# Patient Record
Sex: FEMALE | Race: WHITE | HISPANIC OR LATINO | Employment: FULL TIME | URBAN - METROPOLITAN AREA
[De-identification: names, ages, dates, MRNs, and addresses within clinical notes are randomized per-mention and may not be internally consistent; named-entity substitution may affect disease eponyms.]

---

## 2018-10-09 ENCOUNTER — OFFICE VISIT (OUTPATIENT)
Dept: URGENT CARE | Facility: CLINIC | Age: 23
End: 2018-10-09
Payer: COMMERCIAL

## 2018-10-09 VITALS
HEIGHT: 61 IN | OXYGEN SATURATION: 95 % | DIASTOLIC BLOOD PRESSURE: 70 MMHG | WEIGHT: 172.8 LBS | TEMPERATURE: 98.5 F | BODY MASS INDEX: 32.62 KG/M2 | SYSTOLIC BLOOD PRESSURE: 110 MMHG | HEART RATE: 108 BPM | RESPIRATION RATE: 18 BRPM

## 2018-10-09 DIAGNOSIS — J45.909 REACTIVE AIRWAY DISEASE WITHOUT COMPLICATION, UNSPECIFIED ASTHMA SEVERITY, UNSPECIFIED WHETHER PERSISTENT: ICD-10-CM

## 2018-10-09 PROCEDURE — 99203 OFFICE O/P NEW LOW 30 MIN: CPT | Performed by: PHYSICIAN ASSISTANT

## 2018-10-09 RX ORDER — ALBUTEROL SULFATE 90 UG/1
2 AEROSOL, METERED RESPIRATORY (INHALATION) EVERY 6 HOURS PRN
Qty: 8.5 G | Refills: 0 | Status: SHIPPED | OUTPATIENT
Start: 2018-10-09 | End: 2020-06-22 | Stop reason: SDUPTHER

## 2018-10-10 ASSESSMENT — ENCOUNTER SYMPTOMS
DIARRHEA: 0
CHEST TIGHTNESS: 1
VOMITING: 0
EYE DISCHARGE: 0
WHEEZING: 1
SORE THROAT: 0
HEADACHES: 0
COUGH: 1
FEVER: 0
CHILLS: 0
SPUTUM PRODUCTION: 0
ABDOMINAL PAIN: 0
DIZZINESS: 0
SHORTNESS OF BREATH: 0
EYE REDNESS: 0

## 2018-10-10 NOTE — PROGRESS NOTES
"Subjective:      Yuri Ewing is a 23 y.o. female who presents with Asthma (asthma attack x3 days )            Patient is a pleasant 23-year-old female who presents today today requesting albuterol inhaler.  Patient reports having asthma for the last 5 years however patient has been needing to utilize her mother's rescue inhaler as she is not been able to see a provider or pulmonology in the last several years.  Patient is also requesting referral to pulmonology for further evaluation and appropriate inhalers.  Patient reports last few days with recent weather changes she has been needing to use her mother's albuterol twice a day in the least with notable improvement of symptoms.  She denies any fevers, chills.      Asthma   She complains of chest tightness, cough and wheezing. There is no shortness of breath or sputum production. This is a new problem. Episode onset: 3-4 days ago. The problem occurs intermittently. The problem has been waxing and waning. The cough is non-productive. Associated symptoms include malaise/fatigue. Pertinent negatives include no fever, headaches or sore throat. Her symptoms are aggravated by any activity and change in weather. Relieved by: Her mother's rescue inhaler. Her past medical history is significant for asthma.       Review of Systems   Constitutional: Positive for malaise/fatigue. Negative for chills and fever.   HENT: Negative for sore throat.    Eyes: Negative for discharge and redness.   Respiratory: Positive for cough and wheezing. Negative for sputum production and shortness of breath.    Gastrointestinal: Negative for abdominal pain, diarrhea and vomiting.   Neurological: Negative for dizziness and headaches.   All other systems reviewed and are negative.         Objective:     /70 (BP Location: Left arm, Patient Position: Sitting, BP Cuff Size: Adult)   Pulse (!) 108   Temp 36.9 °C (98.5 °F)   Resp 18   Ht 1.549 m (5' 1\")   Wt 78.4 kg (172 lb 12.8 oz) "   SpO2 95%   BMI 32.65 kg/m²    PMH:  has no past medical history on file.  MEDS:   Current Outpatient Prescriptions:   •  albuterol 108 (90 Base) MCG/ACT Aero Soln inhalation aerosol, Inhale 2 Puffs by mouth every 6 hours as needed for Shortness of Breath., Disp: 8.5 g, Rfl: 0  ALLERGIES: No Known Allergies  SURGHX: No past surgical history on file.  SOCHX:    FH: Family history was reviewed, no pertinent findings to report    Physical Exam   Constitutional: She is oriented to person, place, and time. She appears well-developed and well-nourished. No distress.   HENT:   Head: Normocephalic and atraumatic.   Right Ear: External ear normal.   Left Ear: External ear normal.   Mouth/Throat: Oropharynx is clear and moist. No oropharyngeal exudate.   Eyes: Pupils are equal, round, and reactive to light. Conjunctivae and EOM are normal.   Neck: Normal range of motion. Neck supple. No tracheal deviation present.   Cardiovascular: Normal rate and regular rhythm.    No murmur heard.  Pulmonary/Chest: Effort normal and breath sounds normal. No respiratory distress.   Musculoskeletal: Normal range of motion. She exhibits no edema.   Neurological: She is alert and oriented to person, place, and time. Coordination normal.   Skin: Skin is warm. No rash noted.   Psychiatric: She has a normal mood and affect. Her behavior is normal. Judgment and thought content normal.   Vitals reviewed.              Assessment/Plan:     1. Reactive airway disease without complication, unspecified asthma severity, unspecified whether persistent  - albuterol 108 (90 Base) MCG/ACT Aero Soln inhalation aerosol; Inhale 2 Puffs by mouth every 6 hours as needed for Shortness of Breath.  Dispense: 8.5 g; Refill: 0  - REFERRAL TO PULMONOLOGY    Patient without any notable wheezing on exam however nebulizer treatment was offered for this patient she however declined.  Refill on albuterol was given today along with referred to pulmonary at the request of  the patient today.  Patient is to avoid triggers as discussed, start on allergy medication and follow-up with pulmonology.  Patient given precautionary s/sx that mandate immediate follow up and evaluation in the ED. Advised of risks of not doing so.    DDX, Supportive care, and indications for immediate follow-up discussed with patient.    Instructed to return to clinic or nearest emergency department if we are not available for any change in condition, further concerns, or worsening of symptoms.    The patient demonstrated a good understanding and agreed with the treatment plan.  Please note that this dictation was created using voice recognition software. I have made every reasonable attempt to correct obvious errors, but I expect that there are errors of grammar and possibly content that I did not discover before finalizing the note.

## 2018-11-02 ENCOUNTER — TELEPHONE (OUTPATIENT)
Dept: RADIOLOGY | Facility: IMAGING CENTER | Age: 23
End: 2018-11-02

## 2018-11-02 DIAGNOSIS — R93.89 ABNORMAL CHEST X-RAY: ICD-10-CM

## 2018-11-05 ENCOUNTER — TELEPHONE (OUTPATIENT)
Dept: PULMONOLOGY | Facility: HOSPICE | Age: 23
End: 2018-11-05

## 2018-11-05 DIAGNOSIS — R06.02 SOB (SHORTNESS OF BREATH): ICD-10-CM

## 2018-11-07 ENCOUNTER — NON-PROVIDER VISIT (OUTPATIENT)
Dept: PULMONOLOGY | Facility: HOSPICE | Age: 23
End: 2018-11-07
Payer: COMMERCIAL

## 2018-11-07 ENCOUNTER — OFFICE VISIT (OUTPATIENT)
Dept: PULMONOLOGY | Facility: HOSPICE | Age: 23
End: 2018-11-07
Payer: COMMERCIAL

## 2018-11-07 ENCOUNTER — APPOINTMENT (OUTPATIENT)
Dept: RADIOLOGY | Facility: IMAGING CENTER | Age: 23
End: 2018-11-07
Payer: COMMERCIAL

## 2018-11-07 VITALS
BODY MASS INDEX: 31.53 KG/M2 | HEART RATE: 76 BPM | OXYGEN SATURATION: 98 % | TEMPERATURE: 98.2 F | DIASTOLIC BLOOD PRESSURE: 70 MMHG | RESPIRATION RATE: 14 BRPM | WEIGHT: 167 LBS | HEIGHT: 61 IN | SYSTOLIC BLOOD PRESSURE: 110 MMHG

## 2018-11-07 VITALS — HEIGHT: 61 IN | WEIGHT: 167 LBS | BODY MASS INDEX: 31.53 KG/M2

## 2018-11-07 DIAGNOSIS — R06.02 SOB (SHORTNESS OF BREATH): ICD-10-CM

## 2018-11-07 DIAGNOSIS — R93.89 ABNORMAL CHEST X-RAY: ICD-10-CM

## 2018-11-07 DIAGNOSIS — J45.20 MILD INTERMITTENT ASTHMA WITHOUT COMPLICATION: ICD-10-CM

## 2018-11-07 PROCEDURE — 94726 PLETHYSMOGRAPHY LUNG VOLUMES: CPT | Performed by: INTERNAL MEDICINE

## 2018-11-07 PROCEDURE — 94060 EVALUATION OF WHEEZING: CPT | Performed by: INTERNAL MEDICINE

## 2018-11-07 PROCEDURE — 99203 OFFICE O/P NEW LOW 30 MIN: CPT | Mod: 25 | Performed by: INTERNAL MEDICINE

## 2018-11-07 PROCEDURE — 94729 DIFFUSING CAPACITY: CPT | Performed by: INTERNAL MEDICINE

## 2018-11-07 RX ORDER — ALBUTEROL SULFATE 90 UG/1
AEROSOL, METERED RESPIRATORY (INHALATION)
COMMUNITY
Start: 2011-02-22

## 2018-11-07 RX ORDER — BUDESONIDE AND FORMOTEROL FUMARATE DIHYDRATE 80; 4.5 UG/1; UG/1
2 AEROSOL RESPIRATORY (INHALATION) 2 TIMES DAILY
Qty: 1 INHALER | Refills: 0 | Status: SHIPPED | OUTPATIENT
Start: 2018-11-07 | End: 2022-03-01

## 2018-11-07 RX ORDER — ETONOGESTREL/ETHINYL ESTRADIOL .12-.015MG
RING, VAGINAL VAGINAL
Refills: 0 | Status: ON HOLD | COMMUNITY
Start: 2018-09-04 | End: 2020-04-12

## 2018-11-07 RX ORDER — ALBUTEROL SULFATE 90 UG/1
AEROSOL, METERED RESPIRATORY (INHALATION)
COMMUNITY
Start: 2011-02-22 | End: 2022-03-01 | Stop reason: SDUPTHER

## 2018-11-07 RX ORDER — CODEINE PHOSPHATE AND GUAIFENESIN 10; 100 MG/5ML; MG/5ML
5 SOLUTION ORAL
Status: ON HOLD | COMMUNITY
Start: 2014-08-17 | End: 2020-04-12

## 2018-11-07 RX ORDER — IBUPROFEN 200 MG
200 TABLET ORAL
Status: ON HOLD | COMMUNITY
End: 2020-04-12 | Stop reason: SDUPTHER

## 2018-11-07 RX ORDER — FLUTICASONE PROPIONATE 110 UG/1
1-2 AEROSOL, METERED RESPIRATORY (INHALATION)
COMMUNITY
Start: 2014-08-17 | End: 2022-03-01

## 2018-11-07 RX ORDER — IMIPRAMINE HYDROCHLORIDE 25 MG/1
TABLET ORAL
COMMUNITY
Start: 2011-02-22

## 2018-11-07 ASSESSMENT — PULMONARY FUNCTION TESTS
FEV1_PERCENT_CHANGE: 7
FVC_PREDICTED: 3.43
FEV1/FVC_PERCENT_LLN: 71
FVC_PERCENT_PREDICTED: 93
FEV1/FVC: 71
FEV1/FVC: 76
FEV1/FVC_PERCENT_PREDICTED: 83
FVC_LLN: 2.86
FEV1/FVC_PERCENT_PREDICTED: 83
FEV1/FVC: 76.01
FVC: 3.46
FEV1_LLN: 2.49
FEV1/FVC_PERCENT_LLN: 71
FEV1/FVC_PREDICTED: 85
FEV1: 2.63
FEV1_PERCENT_CHANGE: 14
FEV1/FVC_PERCENT_CHANGE: 6
FEV1_LLN: 2.49
FEV1/FVC_PERCENT_CHANGE: 200
FEV1/FVC_PERCENT_PREDICTED: 87
FVC: 3.22
FEV1_PREDICTED: 2.98
FVC_LLN: 2.86
FEV1: 2.3
FEV1/FVC: 71
FEV1_PERCENT_PREDICTED: 77

## 2018-11-07 ASSESSMENT — PAIN SCALES - GENERAL: PAINLEVEL: NO PAIN

## 2018-11-07 NOTE — PROGRESS NOTES
Yuri Ewing is a 23 y.o. female here for ***. Patient was referred by ***.    History of Present Illness:  ***    Constitutional ROS: No unexpected change in weight, No unexplained fevers  Eyes: No change in vision or blurring or double vision  Mouth/Throat ROS: No sore throat, No recent change in voice or hoarseness  Pulmonary ROS: See present history for pertinent positives  Cardiovascular ROS: No chest pain to suggest acute coronary syndrome  Gastrointestinal ROS: No abdominal pain to suggest peptic disease  Musculoskeletal/Extremities ROS: no acute artritis or unusual swelling  Hematologic/Lymphatic ROS: No easy bleeding or unusual lymph node swelling  Neurologic ROS: No new or unusual weakness  Psychiatric ROS: No hallucinations  Allergic/Immunologic: No  urticaria or allergic rash      Current Outpatient Prescriptions   Medication Sig Dispense Refill   • NUVARING 0.12-0.015 MG/24HR vaginal ring insert 1 ring vaginally for 3 weeks REMOVE for 1 week and repeat AGAIN  0   • Spacer/Aero-Holding Chambers (AEROCHAMBER PLUS CINDY-VU) Misc Use as directed with metered dose inhaler.     • Spacer/Aero-Holding Chambers (AEROCHAMBER PLUS CINDY-VU) Misc by Other route.     • budesonide-formoterol (SYMBICORT) 80-4.5 MCG/ACT Aerosol Inhale 2 Puffs by mouth 2 Times a Day. Use spacer. Rinse mouth after each use. 1 Inhaler 0   • albuterol 108 (90 Base) MCG/ACT Aero Soln inhalation aerosol Inhale 2 Puffs by mouth every 6 hours as needed for Shortness of Breath. 8.5 g 0   • albuterol (PROAIR HFA) 108 (90 Base) MCG/ACT Aero Soln inhalation aerosol Shake well.  Take 2 puffs every 4 hours as needed for cough, chest tightness, or wheezing.     • acetaminophen-codeine #3 (TYLENOL #3) 300-30 MG Tab Take 1-2 tablet by mouth.     • ALBUTEROL INH 2 Puffs by Nebulization route.     • guaifenesin-codeine (ROBITUSSIN AC) Solution oral solution Take 5 mL by mouth.     • fluticasone (FLOVENT HFA) 110 MCG/ACT Aerosol 1-2 Puffs by  "Nebulization route.     • ibuprofen (MOTRIN) 200 MG Tab Take 200 mg by mouth.     • albuterol (PROAIR HFA) 108 (90 Base) MCG/ACT Aero Soln inhalation aerosol by Other route.       No current facility-administered medications for this visit.        Social History   Substance Use Topics   • Smoking status: Never Smoker   • Smokeless tobacco: Never Used   • Alcohol use Yes      Comment: Rarely        Past Medical History:   Diagnosis Date   • Back pain    • Bronchitis    • Chest tightness    • Chickenpox    • Cough    • Difficulty breathing    • Gasping for breath    • Painful breathing    • Shortness of breath    • Sputum production    • Wears glasses    • Wheezing        History reviewed. No pertinent surgical history.    Allergies: Patient has no known allergies.    Family History   Problem Relation Age of Onset   • Alzheimer's Disease Mother    • Alzheimer's Disease Sister        Physical Examination    Vitals:    11/07/18 1009   Height: 1.549 m (5' 1\")   Weight: 75.8 kg (167 lb)   Weight % change since last entry.: 0 %   BP: 110/70   Pulse: 76   BMI (Calculated): 31.55   Resp: 14   Temp: 36.8 °C (98.2 °F)   TempSrc: Oral       General Appearance: alert, no distress  Skin: Skin color, texture, turgor normal. No rashes or lesions.  Eyes: negative  Oropharynx: Lips, mucosa, and tongue normal. Teeth and gums normal. Oropharynx moist and without lesion  Lungs: positive findings: ***  Heart: negative. RRR without murmur, gallop, or rubs.  No ectopy.  Abdomen: Abdomen soft, non-tender. . No masses,  No organomegaly  Extremities:  No deformities, edema, or skin discoloration  Joints: No acute arthritis  Peripheral Pulses:perfused  Neurologic: intact grossly  ***    {MALLAMPATI:75512}    Imaging: ***    PFTS: ***      Assessment and Plan  1. Mild intermittent asthma without complication  ***  - Influenza Vaccine Quad Injection >3Y (PF)      ***  Followup Return in about 3 months (around 2/7/2019) for follow up visit with " Dr. Swetha Diaz.

## 2018-11-07 NOTE — PATIENT INSTRUCTIONS
This lady comes in for evaluation of reactive airway disease, has been using rescue inhaler with good success.  However she has not been on a maintenance inhaler and looking at lung function testing today, she has a dramatic bronchodilator response.  We added Symbicort 80, 2 puffs in the morning and 2 puffs at night as maintenance, she will rinse and gargle after using it.  If she has bronchospasm she can use her pro-air, or nebulizer up to every 4 hours.    We are holding off on the x-ray as she thinks she could be pregnant, we will do that in the future if she has worsening symptoms or if we are certain that she is not carrying a child.    We also recommended a flu vaccine, at present she declines that but plans to read about it and I did place the order, it is deferred at present but she can have that done in the future.    She does have allergies to cats, potentially molds, we did talk about doing a blood allergy panel but she declines that at this time.  If symptoms become more prominent that would be reconsidered.  We will initiate the maintenance inhaler, see her back in about 3 months, sooner for problems

## 2018-11-07 NOTE — PROGRESS NOTES
Yuri Ewing is a 23 y.o. female here for asthma and results of lung function testing. Patient was referred by her primary care doctor.    History of Present Illness:      This lady comes in for evaluation of reactive airway disease, has been using rescue inhaler with good success.  However she has not been on a maintenance inhaler and looking at lung function testing today, she has a dramatic bronchodilator response.  We added Symbicort 80, 2 puffs in the morning and 2 puffs at night as maintenance, she will rinse and gargle after using it.  If she has bronchospasm she can use her pro-air, or nebulizer up to every 4 hours.    We are holding off on the x-ray as she thinks she could be pregnant, we will do that in the future if she has worsening symptoms or if we are certain that she is not carrying a child.    We also recommended a flu vaccine, at present she declines that but plans to read about it and I did place the order, it is deferred at present but she can have that done in the future.    She does have allergies to cats, potentially molds, we did talk about doing a blood allergy panel but she declines that at this time.  If symptoms become more prominent that would be reconsidered.  We will initiate the maintenance inhaler, see her back in about 3 months, sooner for problems    Constitutional ROS: No unexpected change in weight, No unexplained fevers  Eyes: No change in vision or blurring or double vision  Mouth/Throat ROS: No sore throat, No recent change in voice or hoarseness  Pulmonary ROS: See present history for pertinent positives  Cardiovascular ROS: No chest pain to suggest acute coronary syndrome  Gastrointestinal ROS: No abdominal pain to suggest peptic disease  Musculoskeletal/Extremities ROS: no acute artritis or unusual swelling  Hematologic/Lymphatic ROS: No easy bleeding or unusual lymph node swelling  Neurologic ROS: No new or unusual weakness  Psychiatric ROS: No  hallucinations  Allergic/Immunologic: No  urticaria or allergic rash      Current Outpatient Prescriptions   Medication Sig Dispense Refill   • NUVARING 0.12-0.015 MG/24HR vaginal ring insert 1 ring vaginally for 3 weeks REMOVE for 1 week and repeat AGAIN  0   • Spacer/Aero-Holding Chambers (AEROCHAMBER PLUS CINDY-VU) Misc Use as directed with metered dose inhaler.     • Spacer/Aero-Holding Chambers (AEROCHAMBER PLUS CINDY-VU) Misc by Other route.     • budesonide-formoterol (SYMBICORT) 80-4.5 MCG/ACT Aerosol Inhale 2 Puffs by mouth 2 Times a Day. Use spacer. Rinse mouth after each use. 1 Inhaler 0   • albuterol 108 (90 Base) MCG/ACT Aero Soln inhalation aerosol Inhale 2 Puffs by mouth every 6 hours as needed for Shortness of Breath. 8.5 g 0   • albuterol (PROAIR HFA) 108 (90 Base) MCG/ACT Aero Soln inhalation aerosol Shake well.  Take 2 puffs every 4 hours as needed for cough, chest tightness, or wheezing.     • acetaminophen-codeine #3 (TYLENOL #3) 300-30 MG Tab Take 1-2 tablet by mouth.     • ALBUTEROL INH 2 Puffs by Nebulization route.     • guaifenesin-codeine (ROBITUSSIN AC) Solution oral solution Take 5 mL by mouth.     • fluticasone (FLOVENT HFA) 110 MCG/ACT Aerosol 1-2 Puffs by Nebulization route.     • ibuprofen (MOTRIN) 200 MG Tab Take 200 mg by mouth.     • albuterol (PROAIR HFA) 108 (90 Base) MCG/ACT Aero Soln inhalation aerosol by Other route.       No current facility-administered medications for this visit.        Social History   Substance Use Topics   • Smoking status: Never Smoker   • Smokeless tobacco: Never Used   • Alcohol use Yes      Comment: Rarely        Past Medical History:   Diagnosis Date   • Back pain    • Bronchitis    • Chest tightness    • Chickenpox    • Cough    • Difficulty breathing    • Gasping for breath    • Painful breathing    • Shortness of breath    • Sputum production    • Wears glasses    • Wheezing        History reviewed. No pertinent surgical history.    Allergies:  "Patient has no known allergies.    Family History   Problem Relation Age of Onset   • Alzheimer's Disease Mother    • Alzheimer's Disease Sister        Physical Examination    Vitals:    11/07/18 1009   Height: 1.549 m (5' 1\")   Weight: 75.8 kg (167 lb)   Weight % change since last entry.: 0 %   BP: 110/70   Pulse: 76   BMI (Calculated): 31.55   Resp: 14   Temp: 36.8 °C (98.2 °F)   TempSrc: Oral       General Appearance: alert, no distress  Skin: Skin color, texture, turgor normal. No rashes or lesions.  Eyes: negative  Oropharynx: Lips, mucosa, and tongue normal. Teeth and gums normal. Oropharynx moist and without lesion  Lungs: positive findings: Quiet and clear  Heart: negative. RRR without murmur, gallop, or rubs.  No ectopy.  Abdomen: Abdomen soft, non-tender. . No masses,  No organomegaly  Extremities:  No deformities, edema, or skin discoloration  Joints: No acute arthritis  Peripheral Pulses:perfused  Neurologic: intact grossly  No clubbing or cyanosis    II (soft palate, uvula, fauces visible)    Imaging: Deferred by patient    PFTS: Described above      Assessment and Plan  1. Mild intermittent asthma without complication  - Influenza Vaccine Quad Injection >3Y (PF)    2. BMI 36.0-36.9,adult  Patient's body mass index is 31.55 kg/m². Exercise and nutrition counseling were performed at this visit.    This lady comes in for evaluation of reactive airway disease, has been using rescue inhaler with good success.  However she has not been on a maintenance inhaler and looking at lung function testing today, she has a dramatic bronchodilator response.  We added Symbicort 80, 2 puffs in the morning and 2 puffs at night as maintenance, she will rinse and gargle after using it.  If she has bronchospasm she can use her pro-air, or nebulizer up to every 4 hours.    We are holding off on the x-ray as she thinks she could be pregnant, we will do that in the future if she has worsening symptoms or if we are certain that " she is not carrying a child.    We also recommended a flu vaccine, at present she declines that but plans to read about it and I did place the order, it is deferred at present but she can have that done in the future.    She does have allergies to cats, potentially molds, we did talk about doing a blood allergy panel but she declines that at this time.  If symptoms become more prominent that would be reconsidered.  We will initiate the maintenance inhaler, see her back in about 3 months, sooner for problems  Followup Return in about 3 months (around 2/7/2019) for follow up visit with Dr. Swetha Diaz.

## 2018-11-07 NOTE — PROCEDURES
"Technician: NATHAN Shepherd    Technician Comment:  Good patient effort & cooperation.  The results of this test meet the ATS/ERS standards for acceptability & reproducibility.  Test was performed on the [employer] Body Plethysmograph-Elite DX system.  Predicted values were [facility] for spirometry, [facility] for DLCO, hospitals for Lung Volumes.  The DLCO was uncorrected for Hgb.  A bronchodilator of Ventolin HFA -2puffs via spacer administered.  DLCO performed during dilation period.    Interpretation:  1.  FEV1/FVC 71  FEV1 2.30-77% with 14% response to albuterol up to 2.63 \"88%\"  FVC 3.22-93% with 7% response to albuterol  FEF 25-75% is 45% predicted with 55% response to albuterol  MVV 91  SVC 94, , ERV 51, TG 98, , , DLCO 126, VA 90    Impression  Mild but active bronchial asthma with small airway disease  Moderate amount of air trapping related to asthma  Normal perfusion and alveolar volume  "

## 2018-11-16 ENCOUNTER — TELEPHONE (OUTPATIENT)
Dept: PULMONOLOGY | Facility: HOSPICE | Age: 23
End: 2018-11-16

## 2018-11-19 NOTE — TELEPHONE ENCOUNTER
DOCUMENTATION OF PRIOR AUTH STATUS    1. Medication name and dose: Symbicort 80-4.5 mcg    2. Name and Phone # of Prescription coverage company: [employer] [place]  [phone]    3. Date Prior Auth was submitted: 11/16/2018    4. What information was given to obtain insurance decision: Clinical notes    5. Prior Auth letter Approved or Denied: Approved through 12/31/9999    6. Pharmacy notified: Yes    7. Patient notified: Yes

## 2019-02-22 ENCOUNTER — APPOINTMENT (OUTPATIENT)
Dept: PULMONOLOGY | Facility: HOSPICE | Age: 24
End: 2019-02-22
Payer: COMMERCIAL

## 2019-04-01 ENCOUNTER — HOSPITAL ENCOUNTER (OUTPATIENT)
Dept: LAB | Facility: MEDICAL CENTER | Age: 24
End: 2019-04-01
Attending: PHYSICIAN ASSISTANT
Payer: COMMERCIAL

## 2019-04-01 ENCOUNTER — HOSPITAL ENCOUNTER (OUTPATIENT)
Facility: MEDICAL CENTER | Age: 24
End: 2019-04-01
Attending: PHYSICIAN ASSISTANT
Payer: COMMERCIAL

## 2019-04-01 LAB — HCG SERPL QL: NEGATIVE

## 2019-04-01 PROCEDURE — 84703 CHORIONIC GONADOTROPIN ASSAY: CPT

## 2019-04-01 PROCEDURE — 36415 COLL VENOUS BLD VENIPUNCTURE: CPT

## 2019-05-15 ENCOUNTER — TELEPHONE (OUTPATIENT)
Dept: SCHEDULING | Facility: IMAGING CENTER | Age: 24
End: 2019-05-15

## 2019-06-17 ENCOUNTER — APPOINTMENT (OUTPATIENT)
Dept: MEDICAL GROUP | Facility: PHYSICIAN GROUP | Age: 24
End: 2019-06-17
Payer: COMMERCIAL

## 2019-07-29 ENCOUNTER — OFFICE VISIT (OUTPATIENT)
Dept: MEDICAL GROUP | Facility: MEDICAL CENTER | Age: 24
End: 2019-07-29
Payer: COMMERCIAL

## 2019-07-29 VITALS
HEIGHT: 61 IN | HEART RATE: 93 BPM | BODY MASS INDEX: 33.99 KG/M2 | SYSTOLIC BLOOD PRESSURE: 116 MMHG | DIASTOLIC BLOOD PRESSURE: 70 MMHG | WEIGHT: 180 LBS | RESPIRATION RATE: 14 BRPM | OXYGEN SATURATION: 97 % | TEMPERATURE: 98.6 F

## 2019-07-29 DIAGNOSIS — Z3A.01 LESS THAN 8 WEEKS GESTATION OF PREGNANCY: ICD-10-CM

## 2019-07-29 DIAGNOSIS — L20.82 FLEXURAL ECZEMA: ICD-10-CM

## 2019-07-29 PROCEDURE — 99214 OFFICE O/P EST MOD 30 MIN: CPT | Performed by: FAMILY MEDICINE

## 2019-07-29 ASSESSMENT — PATIENT HEALTH QUESTIONNAIRE - PHQ9: CLINICAL INTERPRETATION OF PHQ2 SCORE: 0

## 2019-07-29 NOTE — PROGRESS NOTES
This medical record contains text that has been entered with the assistance of computer voice recognition and dictation software.  Therefore, it may contain unintended errors in text, spelling, punctuation, or grammar        Chief Complaint   Patient presents with   • Establish Care     Need new pcp    • Skin Discoloration     rash or discoloration on arms and stomach and armpits    •         • Pregnancy     pt is newly pregnant        Yuri Sanchez is a 24 y.o. female here evaluation and management of     new issue of flexural rash     Also prenant has questions     Current Outpatient Prescriptions   Medication Sig Dispense Refill   • albuterol (PROAIR HFA) 108 (90 Base) MCG/ACT Aero Soln inhalation aerosol Shake well.  Take 2 puffs every 4 hours as needed for cough, chest tightness, or wheezing.     • acetaminophen-codeine #3 (TYLENOL #3) 300-30 MG Tab Take 1-2 tablet by mouth.     • ALBUTEROL INH 2 Puffs by Nebulization route.     • guaifenesin-codeine (ROBITUSSIN AC) Solution oral solution Take 5 mL by mouth.     • NUVARING 0.12-0.015 MG/24HR vaginal ring insert 1 ring vaginally for 3 weeks REMOVE for 1 week and repeat AGAIN  0   • fluticasone (FLOVENT HFA) 110 MCG/ACT Aerosol 1-2 Puffs by Nebulization route.     • ibuprofen (MOTRIN) 200 MG Tab Take 200 mg by mouth.     • Spacer/Aero-Holding Chambers (AEROCHAMBER PLUS CINDY-VU) Misc Use as directed with metered dose inhaler.     • Spacer/Aero-Holding Chambers (AEROCHAMBER PLUS CINDY-VU) Misc by Other route.     • albuterol (PROAIR HFA) 108 (90 Base) MCG/ACT Aero Soln inhalation aerosol by Other route.     • budesonide-formoterol (SYMBICORT) 80-4.5 MCG/ACT Aerosol Inhale 2 Puffs by mouth 2 Times a Day. Use spacer. Rinse mouth after each use. 1 Inhaler 0   • albuterol 108 (90 Base) MCG/ACT Aero Soln inhalation aerosol Inhale 2 Puffs by mouth every 6 hours as needed for Shortness of Breath. 8.5 g 0     No current facility-administered medications for this visit.  "     Patient Active Problem List    Diagnosis Date Noted   • Less than 8 weeks gestation of pregnancy 07/29/2019   • Flexural eczema 07/29/2019   • Mild intermittent asthma without complication 11/07/2018   • BMI 36.0-36.9,adult 11/07/2018     No past surgical history on file.   Social History   Substance Use Topics   • Smoking status: Never Smoker   • Smokeless tobacco: Never Used   • Alcohol use No     Family History   Problem Relation Age of Onset   • Alzheimer's Disease Mother    • Alzheimer's Disease Sister            ROS  No n/v  all review of system completed and negative except for those listed above     Objective:     /70 (BP Location: Right arm, Patient Position: Sitting, BP Cuff Size: Adult)   Pulse 93   Temp 37 °C (98.6 °F) (Temporal)   Resp 14   Ht 1.549 m (5' 1\")   Wt 81.6 kg (180 lb)   SpO2 97%  Body mass index is 34.01 kg/m².  Physical Exam:      Skin: Warm, dry, good turgor, rash on the flexural surface with hypopigmentaiton           GEN: comfortable, alert and oriented, well nourished, well developed, in no apparent distress   HEENT: NCAT, eyes: pupils equal and reactive, sclera white, EOMIT, good dentition  HEART: limbs warm and well perfused, regular rate, no JVD, no lower extremity edema  LUNGS: speaking in full sentences, not in apparent respiratory distress, no audible wheezes  MSK: normal tone and bulk, no swelling of the joints, gait steady and normal         Assessment and Plan:   The following treatment plan was discussed        Problem List Items Addressed This Visit     Less than 8 weeks gestation of pregnancy     All questions answered  She has ob appointment in 2 weeks  Apparently with the ob who delivered her     She is tired and has concerns about weight   All questions answered             Flexural eczema     Avoidance of trigger  Ok to do topical steroid sparingly   Focus on moisturizer cream vaseline etc                         Instructed to follow up if symptoms " worsen or fail to improve, ER/UC precautions discussed as well    Pauline Mesa MD  [facility], [address]   [place] [address]  Phone: [phone]

## 2019-07-29 NOTE — ASSESSMENT & PLAN NOTE
All questions answered  She has ob appointment in 2 weeks  Apparently with the ob who delivered her     She is tired and has concerns about weight   All questions answered

## 2019-07-29 NOTE — ASSESSMENT & PLAN NOTE
Avoidance of trigger  Ok to do topical steroid sparingly   Focus on moisturizer cream vaseline etc

## 2019-09-18 ENCOUNTER — HOSPITAL ENCOUNTER (OUTPATIENT)
Dept: LAB | Facility: MEDICAL CENTER | Age: 24
End: 2019-09-18
Attending: OBSTETRICS & GYNECOLOGY
Payer: COMMERCIAL

## 2019-09-18 LAB
ABO GROUP BLD: NORMAL
BASOPHILS # BLD AUTO: 0.2 % (ref 0–1.8)
BASOPHILS # BLD: 0.03 K/UL (ref 0–0.12)
BLD GP AB SCN SERPL QL: NORMAL
EOSINOPHIL # BLD AUTO: 0.07 K/UL (ref 0–0.51)
EOSINOPHIL NFR BLD: 0.5 % (ref 0–6.9)
ERYTHROCYTE [DISTWIDTH] IN BLOOD BY AUTOMATED COUNT: 41 FL (ref 35.9–50)
HBV SURFACE AG SER QL: NEGATIVE
HCT VFR BLD AUTO: 40.8 % (ref 37–47)
HCV AB SER QL: NEGATIVE
HGB BLD-MCNC: 14 G/DL (ref 12–16)
HIV 1+2 AB+HIV1 P24 AG SERPL QL IA: NON REACTIVE
IMM GRANULOCYTES # BLD AUTO: 0.06 K/UL (ref 0–0.11)
IMM GRANULOCYTES NFR BLD AUTO: 0.5 % (ref 0–0.9)
LYMPHOCYTES # BLD AUTO: 1.95 K/UL (ref 1–4.8)
LYMPHOCYTES NFR BLD: 15 % (ref 22–41)
MCH RBC QN AUTO: 32.9 PG (ref 27–33)
MCHC RBC AUTO-ENTMCNC: 34.3 G/DL (ref 33.6–35)
MCV RBC AUTO: 96 FL (ref 81.4–97.8)
MONOCYTES # BLD AUTO: 0.73 K/UL (ref 0–0.85)
MONOCYTES NFR BLD AUTO: 5.6 % (ref 0–13.4)
NEUTROPHILS # BLD AUTO: 10.16 K/UL (ref 2–7.15)
NEUTROPHILS NFR BLD: 78.2 % (ref 44–72)
NRBC # BLD AUTO: 0 K/UL
NRBC BLD-RTO: 0 /100 WBC
PLATELET # BLD AUTO: 257 K/UL (ref 164–446)
PMV BLD AUTO: 11.9 FL (ref 9–12.9)
RBC # BLD AUTO: 4.25 M/UL (ref 4.2–5.4)
RH BLD: NORMAL
RUBV AB SER QL: >500 IU/ML
WBC # BLD AUTO: 13 K/UL (ref 4.8–10.8)

## 2019-09-18 PROCEDURE — 86900 BLOOD TYPING SEROLOGIC ABO: CPT

## 2019-09-18 PROCEDURE — 86901 BLOOD TYPING SEROLOGIC RH(D): CPT

## 2019-09-18 PROCEDURE — 87340 HEPATITIS B SURFACE AG IA: CPT

## 2019-09-18 PROCEDURE — 86762 RUBELLA ANTIBODY: CPT

## 2019-09-18 PROCEDURE — 87491 CHLMYD TRACH DNA AMP PROBE: CPT

## 2019-09-18 PROCEDURE — 88175 CYTOPATH C/V AUTO FLUID REDO: CPT

## 2019-09-18 PROCEDURE — 36415 COLL VENOUS BLD VENIPUNCTURE: CPT

## 2019-09-18 PROCEDURE — 85025 COMPLETE CBC W/AUTO DIFF WBC: CPT

## 2019-09-18 PROCEDURE — 86850 RBC ANTIBODY SCREEN: CPT

## 2019-09-18 PROCEDURE — 86803 HEPATITIS C AB TEST: CPT

## 2019-09-18 PROCEDURE — 87591 N.GONORRHOEAE DNA AMP PROB: CPT

## 2019-09-18 PROCEDURE — 86780 TREPONEMA PALLIDUM: CPT

## 2019-09-18 PROCEDURE — 87389 HIV-1 AG W/HIV-1&-2 AB AG IA: CPT

## 2019-09-18 PROCEDURE — 87086 URINE CULTURE/COLONY COUNT: CPT

## 2019-09-19 LAB — TREPONEMA PALLIDUM IGG+IGM AB [PRESENCE] IN SERUM OR PLASMA BY IMMUNOASSAY: NON REACTIVE

## 2019-09-20 LAB
BACTERIA UR CULT: NORMAL
C TRACH DNA GENITAL QL NAA+PROBE: NEGATIVE
CYTOLOGY REG CYTOL: NORMAL
N GONORRHOEA DNA GENITAL QL NAA+PROBE: NEGATIVE
SIGNIFICANT IND 70042: NORMAL
SITE SITE: NORMAL
SOURCE SOURCE: NORMAL
SPECIMEN SOURCE: NORMAL

## 2019-12-27 ENCOUNTER — HOSPITAL ENCOUNTER (OUTPATIENT)
Dept: LAB | Facility: MEDICAL CENTER | Age: 24
End: 2019-12-27
Attending: OBSTETRICS & GYNECOLOGY
Payer: COMMERCIAL

## 2019-12-27 LAB
BLD GP AB SCN SERPL QL: NORMAL
GLUCOSE 1H P 50 G GLC PO SERPL-MCNC: 104 MG/DL (ref 70–139)
HCT VFR BLD AUTO: 38.8 % (ref 37–47)
HGB BLD-MCNC: 12.7 G/DL (ref 12–16)
PLATELET # BLD AUTO: 207 K/UL (ref 164–446)
TREPONEMA PALLIDUM IGG+IGM AB [PRESENCE] IN SERUM OR PLASMA BY IMMUNOASSAY: NON REACTIVE

## 2019-12-27 PROCEDURE — 86780 TREPONEMA PALLIDUM: CPT

## 2019-12-27 PROCEDURE — 85018 HEMOGLOBIN: CPT

## 2019-12-27 PROCEDURE — 82950 GLUCOSE TEST: CPT

## 2019-12-27 PROCEDURE — 85014 HEMATOCRIT: CPT

## 2019-12-27 PROCEDURE — 85049 AUTOMATED PLATELET COUNT: CPT

## 2019-12-27 PROCEDURE — 86850 RBC ANTIBODY SCREEN: CPT

## 2019-12-27 PROCEDURE — 36415 COLL VENOUS BLD VENIPUNCTURE: CPT

## 2020-02-21 ENCOUNTER — HOSPITAL ENCOUNTER (OUTPATIENT)
Dept: LAB | Facility: MEDICAL CENTER | Age: 25
End: 2020-02-21
Attending: OBSTETRICS & GYNECOLOGY
Payer: COMMERCIAL

## 2020-02-21 PROCEDURE — 87081 CULTURE SCREEN ONLY: CPT

## 2020-02-21 PROCEDURE — 87150 DNA/RNA AMPLIFIED PROBE: CPT

## 2020-02-22 LAB — GP B STREP DNA SPEC QL NAA+PROBE: NEGATIVE

## 2020-04-09 ENCOUNTER — ANESTHESIA EVENT (OUTPATIENT)
Dept: OBGYN | Facility: MEDICAL CENTER | Age: 25
End: 2020-04-09
Payer: COMMERCIAL

## 2020-04-09 ENCOUNTER — HOSPITAL ENCOUNTER (INPATIENT)
Facility: MEDICAL CENTER | Age: 25
LOS: 3 days | End: 2020-04-12
Attending: OBSTETRICS & GYNECOLOGY | Admitting: OBSTETRICS & GYNECOLOGY
Payer: COMMERCIAL

## 2020-04-09 ENCOUNTER — ANESTHESIA (OUTPATIENT)
Dept: OBGYN | Facility: MEDICAL CENTER | Age: 25
End: 2020-04-09
Payer: COMMERCIAL

## 2020-04-09 DIAGNOSIS — G89.18 POSTOPERATIVE PAIN: ICD-10-CM

## 2020-04-09 LAB
BASOPHILS # BLD AUTO: 0.3 % (ref 0–1.8)
BASOPHILS # BLD: 0.04 K/UL (ref 0–0.12)
EOSINOPHIL # BLD AUTO: 0.07 K/UL (ref 0–0.51)
EOSINOPHIL NFR BLD: 0.5 % (ref 0–6.9)
ERYTHROCYTE [DISTWIDTH] IN BLOOD BY AUTOMATED COUNT: 46.6 FL (ref 35.9–50)
HCT VFR BLD AUTO: 39.3 % (ref 37–47)
HGB BLD-MCNC: 13 G/DL (ref 12–16)
HOLDING TUBE BB 8507: NORMAL
IMM GRANULOCYTES # BLD AUTO: 0.09 K/UL (ref 0–0.11)
IMM GRANULOCYTES NFR BLD AUTO: 0.6 % (ref 0–0.9)
LYMPHOCYTES # BLD AUTO: 1.26 K/UL (ref 1–4.8)
LYMPHOCYTES NFR BLD: 8.8 % (ref 22–41)
MCH RBC QN AUTO: 31.7 PG (ref 27–33)
MCHC RBC AUTO-ENTMCNC: 33.1 G/DL (ref 33.6–35)
MCV RBC AUTO: 95.9 FL (ref 81.4–97.8)
MONOCYTES # BLD AUTO: 0.62 K/UL (ref 0–0.85)
MONOCYTES NFR BLD AUTO: 4.3 % (ref 0–13.4)
NEUTROPHILS # BLD AUTO: 12.23 K/UL (ref 2–7.15)
NEUTROPHILS NFR BLD: 85.5 % (ref 44–72)
NRBC # BLD AUTO: 0 K/UL
NRBC BLD-RTO: 0 /100 WBC
PLATELET # BLD AUTO: 163 K/UL (ref 164–446)
PMV BLD AUTO: 12.3 FL (ref 9–12.9)
RBC # BLD AUTO: 4.1 M/UL (ref 4.2–5.4)
WBC # BLD AUTO: 14.3 K/UL (ref 4.8–10.8)

## 2020-04-09 PROCEDURE — 85025 COMPLETE CBC W/AUTO DIFF WBC: CPT

## 2020-04-09 PROCEDURE — 700105 HCHG RX REV CODE 258: Performed by: OBSTETRICS & GYNECOLOGY

## 2020-04-09 PROCEDURE — 770002 HCHG ROOM/CARE - OB PRIVATE (112)

## 2020-04-09 PROCEDURE — 700111 HCHG RX REV CODE 636 W/ 250 OVERRIDE (IP): Performed by: ANESTHESIOLOGY

## 2020-04-09 PROCEDURE — 36415 COLL VENOUS BLD VENIPUNCTURE: CPT

## 2020-04-09 PROCEDURE — 10H07YZ INSERTION OF OTHER DEVICE INTO PRODUCTS OF CONCEPTION, VIA NATURAL OR ARTIFICIAL OPENING: ICD-10-PCS | Performed by: OBSTETRICS & GYNECOLOGY

## 2020-04-09 PROCEDURE — 700111 HCHG RX REV CODE 636 W/ 250 OVERRIDE (IP): Performed by: OBSTETRICS & GYNECOLOGY

## 2020-04-09 PROCEDURE — 700102 HCHG RX REV CODE 250 W/ 637 OVERRIDE(OP)

## 2020-04-09 PROCEDURE — 700111 HCHG RX REV CODE 636 W/ 250 OVERRIDE (IP)

## 2020-04-09 PROCEDURE — 700105 HCHG RX REV CODE 258

## 2020-04-09 PROCEDURE — A9270 NON-COVERED ITEM OR SERVICE: HCPCS

## 2020-04-09 PROCEDURE — 10907ZC DRAINAGE OF AMNIOTIC FLUID, THERAPEUTIC FROM PRODUCTS OF CONCEPTION, VIA NATURAL OR ARTIFICIAL OPENING: ICD-10-PCS | Performed by: OBSTETRICS & GYNECOLOGY

## 2020-04-09 RX ORDER — ROPIVACAINE HYDROCHLORIDE 2 MG/ML
INJECTION, SOLUTION EPIDURAL; INFILTRATION; PERINEURAL
Status: COMPLETED
Start: 2020-04-09 | End: 2020-04-09

## 2020-04-09 RX ORDER — SODIUM CHLORIDE, SODIUM LACTATE, POTASSIUM CHLORIDE, AND CALCIUM CHLORIDE .6; .31; .03; .02 G/100ML; G/100ML; G/100ML; G/100ML
250 INJECTION, SOLUTION INTRAVENOUS PRN
Status: DISCONTINUED | OUTPATIENT
Start: 2020-04-09 | End: 2020-04-10 | Stop reason: HOSPADM

## 2020-04-09 RX ORDER — METOCLOPRAMIDE HYDROCHLORIDE 5 MG/ML
10 INJECTION INTRAMUSCULAR; INTRAVENOUS ONCE
Status: COMPLETED | OUTPATIENT
Start: 2020-04-09 | End: 2020-04-09

## 2020-04-09 RX ORDER — ONDANSETRON 2 MG/ML
4 INJECTION INTRAMUSCULAR; INTRAVENOUS EVERY 6 HOURS PRN
Status: DISCONTINUED | OUTPATIENT
Start: 2020-04-09 | End: 2020-04-12 | Stop reason: HOSPADM

## 2020-04-09 RX ORDER — CITRIC ACID/SODIUM CITRATE 334-500MG
30 SOLUTION, ORAL ORAL ONCE
Status: COMPLETED | OUTPATIENT
Start: 2020-04-09 | End: 2020-04-09

## 2020-04-09 RX ORDER — MISOPROSTOL 200 UG/1
800 TABLET ORAL
Status: COMPLETED | OUTPATIENT
Start: 2020-04-09 | End: 2020-04-10

## 2020-04-09 RX ORDER — CITRIC ACID/SODIUM CITRATE 334-500MG
SOLUTION, ORAL ORAL
Status: COMPLETED
Start: 2020-04-09 | End: 2020-04-09

## 2020-04-09 RX ORDER — BUPIVACAINE HYDROCHLORIDE 2.5 MG/ML
INJECTION, SOLUTION EPIDURAL; INFILTRATION; INTRACAUDAL
Status: ACTIVE
Start: 2020-04-09 | End: 2020-04-10

## 2020-04-09 RX ORDER — SODIUM CHLORIDE, SODIUM GLUCONATE, SODIUM ACETATE, POTASSIUM CHLORIDE AND MAGNESIUM CHLORIDE 526; 502; 368; 37; 30 MG/100ML; MG/100ML; MG/100ML; MG/100ML; MG/100ML
INJECTION, SOLUTION INTRAVENOUS
Status: COMPLETED
Start: 2020-04-09 | End: 2020-04-09

## 2020-04-09 RX ORDER — AMPICILLIN 2 G/1
INJECTION, POWDER, FOR SOLUTION INTRAVENOUS
Status: ACTIVE
Start: 2020-04-09 | End: 2020-04-10

## 2020-04-09 RX ORDER — SODIUM CHLORIDE 9 MG/ML
INJECTION, SOLUTION INTRAVENOUS
Status: ACTIVE
Start: 2020-04-09 | End: 2020-04-10

## 2020-04-09 RX ORDER — ONDANSETRON 4 MG/1
4 TABLET, ORALLY DISINTEGRATING ORAL EVERY 6 HOURS PRN
Status: DISCONTINUED | OUTPATIENT
Start: 2020-04-09 | End: 2020-04-12 | Stop reason: HOSPADM

## 2020-04-09 RX ORDER — HYDROCODONE BITARTRATE AND ACETAMINOPHEN 5; 325 MG/1; MG/1
1 TABLET ORAL EVERY 4 HOURS PRN
Status: DISCONTINUED | OUTPATIENT
Start: 2020-04-09 | End: 2020-04-12 | Stop reason: HOSPADM

## 2020-04-09 RX ORDER — IBUPROFEN 600 MG/1
600 TABLET ORAL EVERY 6 HOURS PRN
Status: DISCONTINUED | OUTPATIENT
Start: 2020-04-09 | End: 2020-04-12 | Stop reason: HOSPADM

## 2020-04-09 RX ORDER — SODIUM CHLORIDE, SODIUM LACTATE, POTASSIUM CHLORIDE, AND CALCIUM CHLORIDE .6; .31; .03; .02 G/100ML; G/100ML; G/100ML; G/100ML
1000 INJECTION, SOLUTION INTRAVENOUS
Status: DISCONTINUED | OUTPATIENT
Start: 2020-04-09 | End: 2020-04-10 | Stop reason: HOSPADM

## 2020-04-09 RX ORDER — ROPIVACAINE HYDROCHLORIDE 2 MG/ML
INJECTION, SOLUTION EPIDURAL; INFILTRATION; PERINEURAL CONTINUOUS
Status: DISCONTINUED | OUTPATIENT
Start: 2020-04-09 | End: 2020-04-10

## 2020-04-09 RX ORDER — SODIUM CHLORIDE, SODIUM LACTATE, POTASSIUM CHLORIDE, CALCIUM CHLORIDE 600; 310; 30; 20 MG/100ML; MG/100ML; MG/100ML; MG/100ML
INJECTION, SOLUTION INTRAVENOUS CONTINUOUS
Status: DISPENSED | OUTPATIENT
Start: 2020-04-09 | End: 2020-04-09

## 2020-04-09 RX ORDER — MISOPROSTOL 200 UG/1
TABLET ORAL
Status: COMPLETED
Start: 2020-04-09 | End: 2020-04-10

## 2020-04-09 RX ORDER — HYDROCODONE BITARTRATE AND ACETAMINOPHEN 10; 325 MG/1; MG/1
1 TABLET ORAL EVERY 4 HOURS PRN
Status: DISCONTINUED | OUTPATIENT
Start: 2020-04-09 | End: 2020-04-12 | Stop reason: HOSPADM

## 2020-04-09 RX ORDER — METOCLOPRAMIDE HYDROCHLORIDE 5 MG/ML
INJECTION INTRAMUSCULAR; INTRAVENOUS
Status: COMPLETED
Start: 2020-04-09 | End: 2020-04-09

## 2020-04-09 RX ORDER — SODIUM CHLORIDE, SODIUM GLUCONATE, SODIUM ACETATE, POTASSIUM CHLORIDE AND MAGNESIUM CHLORIDE 526; 502; 368; 37; 30 MG/100ML; MG/100ML; MG/100ML; MG/100ML; MG/100ML
1500 INJECTION, SOLUTION INTRAVENOUS ONCE
Status: COMPLETED | OUTPATIENT
Start: 2020-04-09 | End: 2020-04-09

## 2020-04-09 RX ADMIN — SODIUM CHLORIDE, POTASSIUM CHLORIDE, SODIUM LACTATE AND CALCIUM CHLORIDE: 600; 310; 30; 20 INJECTION, SOLUTION INTRAVENOUS at 08:41

## 2020-04-09 RX ADMIN — SODIUM CHLORIDE, POTASSIUM CHLORIDE, SODIUM LACTATE AND CALCIUM CHLORIDE: 600; 310; 30; 20 INJECTION, SOLUTION INTRAVENOUS at 12:52

## 2020-04-09 RX ADMIN — BUPIVACAINE HYDROCHLORIDE 4 ML: 2.5 INJECTION, SOLUTION EPIDURAL; INFILTRATION; INTRACAUDAL; PERINEURAL at 13:28

## 2020-04-09 RX ADMIN — FENTANYL CITRATE 100 MCG: 0.05 INJECTION, SOLUTION INTRAMUSCULAR; INTRAVENOUS at 13:13

## 2020-04-09 RX ADMIN — ROPIVACAINE HYDROCHLORIDE: 2 INJECTION, SOLUTION EPIDURAL; INFILTRATION at 20:39

## 2020-04-09 RX ADMIN — METOCLOPRAMIDE HYDROCHLORIDE 10 MG: 5 INJECTION INTRAMUSCULAR; INTRAVENOUS at 23:41

## 2020-04-09 RX ADMIN — FENTANYL CITRATE 50 MCG: 50 INJECTION, SOLUTION INTRAMUSCULAR; INTRAVENOUS at 13:23

## 2020-04-09 RX ADMIN — Medication 30 ML: at 23:42

## 2020-04-09 RX ADMIN — ROPIVACAINE HYDROCHLORIDE: 2 INJECTION, SOLUTION EPIDURAL; INFILTRATION at 13:25

## 2020-04-09 RX ADMIN — FAMOTIDINE 20 MG: 10 INJECTION INTRAVENOUS at 23:41

## 2020-04-09 RX ADMIN — OXYTOCIN 1 MILLI-UNITS/MIN: 10 INJECTION, SOLUTION INTRAMUSCULAR; INTRAVENOUS at 08:59

## 2020-04-09 RX ADMIN — METOCLOPRAMIDE 10 MG: 5 INJECTION, SOLUTION INTRAMUSCULAR; INTRAVENOUS at 23:41

## 2020-04-09 RX ADMIN — SODIUM CITRATE AND CITRIC ACID MONOHYDRATE 30 ML: 500; 334 SOLUTION ORAL at 23:42

## 2020-04-09 RX ADMIN — FENTANYL CITRATE 50 MCG: 50 INJECTION, SOLUTION INTRAMUSCULAR; INTRAVENOUS at 13:28

## 2020-04-09 RX ADMIN — BUPIVACAINE HYDROCHLORIDE 4 ML: 2.5 INJECTION, SOLUTION EPIDURAL; INFILTRATION; INTRACAUDAL; PERINEURAL at 13:23

## 2020-04-09 RX ADMIN — FENTANYL CITRATE 100 MCG: 0.05 INJECTION, SOLUTION INTRAMUSCULAR; INTRAVENOUS at 12:04

## 2020-04-09 RX ADMIN — AMPICILLIN SODIUM 2000 MG: 2 INJECTION, POWDER, FOR SOLUTION INTRAMUSCULAR; INTRAVENOUS at 23:29

## 2020-04-09 RX ADMIN — SODIUM CHLORIDE, SODIUM GLUCONATE, SODIUM ACETATE, POTASSIUM CHLORIDE AND MAGNESIUM CHLORIDE 1500 ML: 526; 502; 368; 37; 30 INJECTION, SOLUTION INTRAVENOUS at 23:13

## 2020-04-09 ASSESSMENT — PATIENT HEALTH QUESTIONNAIRE - PHQ9
SUM OF ALL RESPONSES TO PHQ9 QUESTIONS 1 AND 2: 0
2. FEELING DOWN, DEPRESSED, IRRITABLE, OR HOPELESS: NOT AT ALL
1. LITTLE INTEREST OR PLEASURE IN DOING THINGS: NOT AT ALL

## 2020-04-09 ASSESSMENT — LIFESTYLE VARIABLES: ALCOHOL_USE: NO

## 2020-04-09 NOTE — ANESTHESIA PREPROCEDURE EVALUATION
shepard pregnancy at 41 weeks gestation, mild asthma, denies any other significant PMHx, no pregnancy complications.    To OR at 0014 (4/10/2020) for fetal intolerance to labor    Relevant Problems   PULMONARY   (+) Mild intermittent asthma without complication       Physical Exam    Airway   Mallampati: II  TM distance: >3 FB  Neck ROM: full       Cardiovascular - normal exam  Rhythm: regular  Rate: normal  (-) murmur     Dental - normal exam         Pulmonary - normal exam  Breath sounds clear to auscultation     Abdominal    Neurological - normal exam                 Anesthesia Plan    ASA 2       Plan - epidural   Neuraxial block will be labor analgesia              Pertinent diagnostic labs and testing reviewed    Informed Consent:    Anesthetic plan and risks discussed with patient.

## 2020-04-09 NOTE — H&P
DATE OF ADMISSION:  2020    ADMITTING DIAGNOSES:  1. Intrauterine pregnancy at 41 and 1/7th weeks.  2. Labor.  3. Rh negative status.  4. Rastafarian.    HISTORY OF PRESENT ILLNESS:  This is a 24-year-old  1, para 0 with an   estimated date of confinement of 2020 established by last menstrual   period consistent with an 11-week ultrasound who presents to labor and   delivery this morning with complaints of contractions.  She was originally 2   cm dilated, 80% effaced, and -2 station.  She walked for an hour and is now 3   cm dilated, 100% effaced, and -2 station.  The recommendation is made for   admission.  The patient agrees and is considering an epidural for pain   management and will decide when she wants to have that.  She is also a   Rastafarian and will be signing bloodless paperwork.  She is also   amenable to being augmented with Pitocin if necessary.    Prenatal care has been with Dr. Dumas.  Her estimated date of confinement   of 2020 was established by last menstrual period consistent with an   11-week ultrasound.    PRENATAL LABS:  Her blood type is O negative.  Antibody screen is negative.    RPR is nonreactive.  Rubella is immune.  Hepatitis B surface antigen is   negative.  Hepatitis C is negative.  HIV is negative.  Her 1-hour Glucola is   normal.  Her group B strep status is negative.  She declined any chromosomal   or genetic screening for the fetus.    COMPLICATIONS WITH THE PREGNANCY:  Include Rh negative status for which she   did receive RhoGAM.  She is obese, but only gained 8 pounds during the   pregnancy.  She has a fundal anterior placenta.    ALLERGIES:  She has no known drug allergies.    MEDICATIONS:  Include prenatal vitamins and albuterol sulfate.    PAST MEDICAL HISTORY:  Significant for asthma.    FAMILY HISTORY:  She has a maternal aunt and cousin with ovarian cancer.    SOCIAL HISTORY:  She denies tobacco, alcohol, or IV drug  use.    GYNECOLOGIC HISTORY:  She has no history of any HSV or abnormal Paps.    OBSTETRICAL HISTORY:   1 is her current pregnancy.    PHYSICAL EXAMINATION:  VITAL SIGNS:  Temperature is 96.7, pulse 91, and blood pressure 121/75.  GENERAL:  She is pleasant and in mild distress with contractions.  ABDOMEN:  Gravid and nontender.  EXTREMITIES:  Show +2 pedal edema.  She has no cords or Homans sign.  PELVIC:  Fetal heart tones are in the 150s and a category 1 tracing.    Tocometry shows contractions every 4 minutes.  Cervical exam, she is 3 cm   dilated, 100% effaced, -2 station, and vertex presentation.  Estimated fetal   weight is 3600 g.    IMPRESSION:  This is a 24-year-old  1, para 0 at 41 and 1/7th weeks in   early labor.    PLAN:  1. Patient will be admitted to labor and delivery.  2. IV fluids and Pitocin will be started.  3. There is currently reassuring fetal surveillance.  4. She will sign the paperwork for the bloodless program.             ____________________________________     MD ALVIN OLIVIA / SOPHIA    DD:  2020 07:11:27  DT:  2020 07:42:41    D#:  [id number]  Job#:  [id number]    cc: UZAIR DONATO MD

## 2020-04-09 NOTE — PROGRESS NOTES
FHR Cat I.  Ctx q3  minutes on pitocin.  cx 4/90/-1  AROM---clear AF, IUPC placed.    Plan:  Titrate pitocin, deliver.

## 2020-04-09 NOTE — H&P
DATE OF ADMISSION:  2020    ADMITTING DIAGNOSES:  1.  Intrauterine pregnancy at 41 and 1/7th weeks.  2.  Labor.  3.  Rh negative status.  4.  Bahai.    HISTORY OF PRESENT ILLNESS:  This is a 24-year-old  1, para 0 with an   estimated date of confinement of 2020 established by last menstrual   period consistent with an 11-week ultrasound who presents to labor and   delivery this morning with complaints of contractions.  She was originally   2-cm dilated, 80% effaced, and -2 station.  She walked for an hour and is now   3-cm dilated, 100% effaced, and -2 station.  The recommendation is made for   admission.  The patient agrees and is considering an epidural for pain   management and will decide when she wants to have that.  She is also a   Bahai and will be signing bloodless paperwork.  She is also   amenable to being augmented with Pitocin if necessary.    Prenatal care has been with Dr. Dumas.  Her estimated date of confinement   of 2020 was established by last menstrual period consistent with an   11-week ultrasound.    PRENATAL LABS:  Her blood type is O negative.  Antibody screen is negative.    RPR is nonreactive.  Rubella is immune.  Hepatitis B surface antigen is   negative.  Hepatitis C is negative.  HIV is negative.  Her 1-hour Glucola is   normal.  Her group B strep status is negative.  She declined any chromosomal   or genetic screening for the fetus.    COMPLICATIONS WITH THE PREGNANCY:  Include Rh negative status for which she   did receive RhoGAM.  She is obese, but only gained 8 pounds during the   pregnancy.  She has a fundal anterior placenta.    ALLERGIES:  She has no known drug allergies.    MEDICATIONS:  Include prenatal vitamins and albuterol sulfate.    PAST SURGICAL HISTORY:  Significant for asthma.    FAMILY HISTORY:  She has a maternal aunt and cousin with ovarian cancer.    SOCIAL HISTORY:  She denies tobacco, alcohol, or IV drug  use.    GYNECOLOGIC HISTORY:  She has no history of any HSV or abnormal Paps.    OBSTETRICAL HISTORY:   1 is her current pregnancy.    PHYSICAL EXAMINATION:  VITAL SIGNS:  Temperature is 96.7, pulse 91, and blood pressure 121/75.  GENERAL:  She is pleasant and in mild distress with contractions.  ABDOMEN:  Gravid and nontender.  EXTREMITIES:  Show +2 pedal edema.  She has no cords or Homans sign.  PELVIC:  Fetal heart tones are in the 150s and a category 1 tracing.    Tocometry shows contractions every 4 minutes.  Cervical exam, she is 3-cm   dilated, 100% effaced, -2 station, and vertex presentation.  Estimated fetal   weight is 3600 g.    IMPRESSION:  This is a 24-year-old  1, para 0 at 41 and 1/7th weeks in   early labor.    PLAN:  1.  Patient will be admitted to labor and delivery.  2.  IV fluids and Pitocin will be started.  3.  There is currently reassuring fetal surveillance.  4.  She will sign the paperwork for the bloodless program.       ____________________________________     MD ALVIN OLIVIA / SOPHIA    DD:  2020 07:11:27  DT:  2020 07:42:41    D#:  [id number]  Job#:  [id number]    cc: UZAIR DONATO MD

## 2020-04-09 NOTE — PROGRESS NOTES
"0430- pt is a , 41.1 weeks gestation IUP, with c/o uc's throughout day worsening in intensity and frequency since 0200 with loss of mucus plug. No c/o lof, bleeding or dfm. Pt states \"I was 1cm on Monday in the office\". efm and toco placed, vss. sve performed, 2-3/80/-2.   0525- Dr. Escobar called and given report, received orders to allow pt to ambulate q1hr then recheck cervix. Discussed poc with pt.  0700- pt back in room from ambulating in hallway. Dr. Escobar at pt bedside, sve performed, 3/100/-2. Discussed poc for admit IOL with pitocin.  Report given to PETE Cid  "

## 2020-04-09 NOTE — ANESTHESIA PROCEDURE NOTES
Epidural Block  Date/Time: 4/9/2020 1:16 PM  Performed by: Cornelius Pineda M.D.  Authorized by: Cornelius Pineda M.D.     Patient Location:  OB  Start Time:  4/9/2020 1:16 PM  End Time:  4/9/2020 1:23 PM  Reason for Block: labor analgesia    patient identified, IV checked, site marked, risks and benefits discussed, surgical consent, monitors and equipment checked, pre-op evaluation and timeout performed    Patient Position:  Sitting  Prep: ChloraPrep, patient draped and sterile technique    Monitoring:  Blood pressure, continuous pulse oximetry and heart rate  Approach:  Midline  Location:  L3-L4  Injection Technique:  KARSON saline  Skin infiltration:  Lidocaine  Strength:  1%  Dose:  3ml  Needle Type:  Tuohy  Needle Gauge:  17 G  Needle Length:  3.5 in  Loss of resistance::  6  Catheter Size:  19 G  Catheter at Skin Depth:  12  Test Dose Result:  Negative   Success on 2nd pass at same level  No heme/CSF  Catheter threaded easily  Negative aspiration  No evidence of complications  Patient comfortable after loading dose

## 2020-04-10 LAB
ACTION RH IMMUNE GLOB 8505RHG: NORMAL
ERYTHROCYTE [DISTWIDTH] IN BLOOD BY AUTOMATED COUNT: 47.5 FL (ref 35.9–50)
HCT VFR BLD AUTO: 31.2 % (ref 37–47)
HGB BLD-MCNC: 10.6 G/DL (ref 12–16)
IMMUNE ROSETTING TEST 8505FMH: NORMAL
MCH RBC QN AUTO: 32.4 PG (ref 27–33)
MCHC RBC AUTO-ENTMCNC: 34 G/DL (ref 33.6–35)
MCV RBC AUTO: 95.4 FL (ref 81.4–97.8)
NUMBER OF RH DOSES IND 8505RD: 1
PATHOLOGY CONSULT NOTE: NORMAL
PLATELET # BLD AUTO: 151 K/UL (ref 164–446)
PMV BLD AUTO: 11.9 FL (ref 9–12.9)
RBC # BLD AUTO: 3.27 M/UL (ref 4.2–5.4)
WBC # BLD AUTO: 22.7 K/UL (ref 4.8–10.8)

## 2020-04-10 PROCEDURE — 700102 HCHG RX REV CODE 250 W/ 637 OVERRIDE(OP): Performed by: ANESTHESIOLOGY

## 2020-04-10 PROCEDURE — 59514 CESAREAN DELIVERY ONLY: CPT

## 2020-04-10 PROCEDURE — 36415 COLL VENOUS BLD VENIPUNCTURE: CPT

## 2020-04-10 PROCEDURE — 700102 HCHG RX REV CODE 250 W/ 637 OVERRIDE(OP): Performed by: OBSTETRICS & GYNECOLOGY

## 2020-04-10 PROCEDURE — 770002 HCHG ROOM/CARE - OB PRIVATE (112)

## 2020-04-10 PROCEDURE — 700101 HCHG RX REV CODE 250: Performed by: ANESTHESIOLOGY

## 2020-04-10 PROCEDURE — 700112 HCHG RX REV CODE 229: Performed by: OBSTETRICS & GYNECOLOGY

## 2020-04-10 PROCEDURE — 700102 HCHG RX REV CODE 250 W/ 637 OVERRIDE(OP)

## 2020-04-10 PROCEDURE — A9270 NON-COVERED ITEM OR SERVICE: HCPCS | Performed by: ANESTHESIOLOGY

## 2020-04-10 PROCEDURE — 700105 HCHG RX REV CODE 258: Performed by: ANESTHESIOLOGY

## 2020-04-10 PROCEDURE — 306288 HCHG RETRACTOR C SECTION LG

## 2020-04-10 PROCEDURE — A9270 NON-COVERED ITEM OR SERVICE: HCPCS

## 2020-04-10 PROCEDURE — A9270 NON-COVERED ITEM OR SERVICE: HCPCS | Performed by: OBSTETRICS & GYNECOLOGY

## 2020-04-10 PROCEDURE — 3E0334Z INTRODUCTION OF SERUM, TOXOID AND VACCINE INTO PERIPHERAL VEIN, PERCUTANEOUS APPROACH: ICD-10-PCS | Performed by: OBSTETRICS & GYNECOLOGY

## 2020-04-10 PROCEDURE — 88307 TISSUE EXAM BY PATHOLOGIST: CPT

## 2020-04-10 PROCEDURE — 304966 HCHG RECOVERY SVSC TIME ADDL 1/2 HR: Performed by: OBSTETRICS & GYNECOLOGY

## 2020-04-10 PROCEDURE — 305385 HCHG SURGICAL SERVICES 1/4 HOUR: Performed by: OBSTETRICS & GYNECOLOGY

## 2020-04-10 PROCEDURE — 700111 HCHG RX REV CODE 636 W/ 250 OVERRIDE (IP): Performed by: OBSTETRICS & GYNECOLOGY

## 2020-04-10 PROCEDURE — 85461 HEMOGLOBIN FETAL: CPT

## 2020-04-10 PROCEDURE — 700111 HCHG RX REV CODE 636 W/ 250 OVERRIDE (IP): Performed by: ANESTHESIOLOGY

## 2020-04-10 PROCEDURE — 59514 CESAREAN DELIVERY ONLY: CPT | Mod: 80 | Performed by: OBSTETRICS & GYNECOLOGY

## 2020-04-10 PROCEDURE — 306828 HCHG ANES-TIME GENERAL: Performed by: OBSTETRICS & GYNECOLOGY

## 2020-04-10 PROCEDURE — 700105 HCHG RX REV CODE 258: Performed by: OBSTETRICS & GYNECOLOGY

## 2020-04-10 PROCEDURE — A6212 FOAM DRG <=16 SQ IN W/BORDER: HCPCS

## 2020-04-10 PROCEDURE — 303615 HCHG EPIDURAL/SPINAL ANESTHESIA FOR LABOR

## 2020-04-10 PROCEDURE — 85027 COMPLETE CBC AUTOMATED: CPT

## 2020-04-10 PROCEDURE — 304964 HCHG RECOVERY ROOM TIME 1HR: Performed by: OBSTETRICS & GYNECOLOGY

## 2020-04-10 RX ORDER — SODIUM CHLORIDE, SODIUM LACTATE, POTASSIUM CHLORIDE, CALCIUM CHLORIDE 600; 310; 30; 20 MG/100ML; MG/100ML; MG/100ML; MG/100ML
INJECTION, SOLUTION INTRAVENOUS PRN
Status: DISCONTINUED | OUTPATIENT
Start: 2020-04-10 | End: 2020-04-12 | Stop reason: HOSPADM

## 2020-04-10 RX ORDER — HYDROMORPHONE HYDROCHLORIDE 1 MG/ML
0.5 INJECTION, SOLUTION INTRAMUSCULAR; INTRAVENOUS; SUBCUTANEOUS
Status: CANCELLED | OUTPATIENT
Start: 2020-04-10

## 2020-04-10 RX ORDER — MISOPROSTOL 200 UG/1
600 TABLET ORAL
Status: DISCONTINUED | OUTPATIENT
Start: 2020-04-10 | End: 2020-04-12 | Stop reason: HOSPADM

## 2020-04-10 RX ORDER — OXYCODONE AND ACETAMINOPHEN 10; 325 MG/1; MG/1
1 TABLET ORAL EVERY 4 HOURS PRN
Status: DISCONTINUED | OUTPATIENT
Start: 2020-04-10 | End: 2020-04-12 | Stop reason: HOSPADM

## 2020-04-10 RX ORDER — DIPHENHYDRAMINE HYDROCHLORIDE 50 MG/ML
12.5 INJECTION INTRAMUSCULAR; INTRAVENOUS EVERY 6 HOURS PRN
Status: DISCONTINUED | OUTPATIENT
Start: 2020-04-10 | End: 2020-04-12 | Stop reason: HOSPADM

## 2020-04-10 RX ORDER — HYDROMORPHONE HYDROCHLORIDE 1 MG/ML
0.2 INJECTION, SOLUTION INTRAMUSCULAR; INTRAVENOUS; SUBCUTANEOUS
Status: ACTIVE | OUTPATIENT
Start: 2020-04-10 | End: 2020-04-11

## 2020-04-10 RX ORDER — SIMETHICONE 80 MG
80 TABLET,CHEWABLE ORAL 4 TIMES DAILY PRN
Status: DISCONTINUED | OUTPATIENT
Start: 2020-04-10 | End: 2020-04-12 | Stop reason: HOSPADM

## 2020-04-10 RX ORDER — METHYLERGONOVINE MALEATE 0.2 MG/ML
INJECTION INTRAVENOUS PRN
Status: DISCONTINUED | OUTPATIENT
Start: 2020-04-10 | End: 2020-04-10 | Stop reason: SURG

## 2020-04-10 RX ORDER — SODIUM CHLORIDE, SODIUM LACTATE, POTASSIUM CHLORIDE, CALCIUM CHLORIDE 600; 310; 30; 20 MG/100ML; MG/100ML; MG/100ML; MG/100ML
INJECTION, SOLUTION INTRAVENOUS CONTINUOUS
Status: CANCELLED | OUTPATIENT
Start: 2020-04-10

## 2020-04-10 RX ORDER — SODIUM CHLORIDE, SODIUM GLUCONATE, SODIUM ACETATE, POTASSIUM CHLORIDE AND MAGNESIUM CHLORIDE 526; 502; 368; 37; 30 MG/100ML; MG/100ML; MG/100ML; MG/100ML; MG/100ML
INJECTION, SOLUTION INTRAVENOUS
Status: DISCONTINUED | OUTPATIENT
Start: 2020-04-10 | End: 2020-04-10 | Stop reason: SURG

## 2020-04-10 RX ORDER — METHYLERGONOVINE MALEATE 0.2 MG/ML
0.2 INJECTION INTRAVENOUS
Status: DISCONTINUED | OUTPATIENT
Start: 2020-04-10 | End: 2020-04-12 | Stop reason: HOSPADM

## 2020-04-10 RX ORDER — PHENYLEPHRINE HCL IN 0.9% NACL 0.5 MG/5ML
SYRINGE (ML) INTRAVENOUS PRN
Status: DISCONTINUED | OUTPATIENT
Start: 2020-04-10 | End: 2020-04-10 | Stop reason: SURG

## 2020-04-10 RX ORDER — ONDANSETRON 2 MG/ML
4 INJECTION INTRAMUSCULAR; INTRAVENOUS EVERY 6 HOURS PRN
Status: DISCONTINUED | OUTPATIENT
Start: 2020-04-10 | End: 2020-04-12 | Stop reason: HOSPADM

## 2020-04-10 RX ORDER — LIDOCAINE HCL/EPINEPHRINE/PF 2%-1:200K
VIAL (ML) INJECTION PRN
Status: DISCONTINUED | OUTPATIENT
Start: 2020-04-10 | End: 2020-04-10 | Stop reason: SURG

## 2020-04-10 RX ORDER — HYDROMORPHONE HYDROCHLORIDE 1 MG/ML
0.2 INJECTION, SOLUTION INTRAMUSCULAR; INTRAVENOUS; SUBCUTANEOUS
Status: CANCELLED | OUTPATIENT
Start: 2020-04-10

## 2020-04-10 RX ORDER — ACETAMINOPHEN 500 MG
1000 TABLET ORAL EVERY 6 HOURS
Status: COMPLETED | OUTPATIENT
Start: 2020-04-10 | End: 2020-04-10

## 2020-04-10 RX ORDER — IBUPROFEN 600 MG/1
600 TABLET ORAL EVERY 6 HOURS PRN
Status: DISCONTINUED | OUTPATIENT
Start: 2020-04-10 | End: 2020-04-12 | Stop reason: HOSPADM

## 2020-04-10 RX ORDER — OXYCODONE HCL 5 MG/5 ML
5 SOLUTION, ORAL ORAL
Status: CANCELLED | OUTPATIENT
Start: 2020-04-10

## 2020-04-10 RX ORDER — KETOROLAC TROMETHAMINE 30 MG/ML
INJECTION, SOLUTION INTRAMUSCULAR; INTRAVENOUS PRN
Status: DISCONTINUED | OUTPATIENT
Start: 2020-04-10 | End: 2020-04-10 | Stop reason: SURG

## 2020-04-10 RX ORDER — MORPHINE SULFATE 4 MG/ML
4 INJECTION, SOLUTION INTRAMUSCULAR; INTRAVENOUS
Status: DISCONTINUED | OUTPATIENT
Start: 2020-04-10 | End: 2020-04-12 | Stop reason: HOSPADM

## 2020-04-10 RX ORDER — OXYTOCIN 10 [USP'U]/ML
INJECTION, SOLUTION INTRAMUSCULAR; INTRAVENOUS PRN
Status: DISCONTINUED | OUTPATIENT
Start: 2020-04-10 | End: 2020-04-10 | Stop reason: SURG

## 2020-04-10 RX ORDER — CARBOPROST TROMETHAMINE 250 UG/ML
250 INJECTION, SOLUTION INTRAMUSCULAR
Status: DISCONTINUED | OUTPATIENT
Start: 2020-04-10 | End: 2020-04-12 | Stop reason: HOSPADM

## 2020-04-10 RX ORDER — DOCUSATE SODIUM 100 MG/1
100 CAPSULE, LIQUID FILLED ORAL 2 TIMES DAILY PRN
Status: DISCONTINUED | OUTPATIENT
Start: 2020-04-10 | End: 2020-04-12 | Stop reason: HOSPADM

## 2020-04-10 RX ORDER — HYDRALAZINE HYDROCHLORIDE 20 MG/ML
5 INJECTION INTRAMUSCULAR; INTRAVENOUS
Status: CANCELLED | OUTPATIENT
Start: 2020-04-10

## 2020-04-10 RX ORDER — OXYCODONE HYDROCHLORIDE AND ACETAMINOPHEN 5; 325 MG/1; MG/1
1 TABLET ORAL EVERY 4 HOURS PRN
Status: DISCONTINUED | OUTPATIENT
Start: 2020-04-10 | End: 2020-04-12 | Stop reason: HOSPADM

## 2020-04-10 RX ORDER — MORPHINE SULFATE 0.5 MG/ML
INJECTION, SOLUTION EPIDURAL; INTRATHECAL; INTRAVENOUS PRN
Status: DISCONTINUED | OUTPATIENT
Start: 2020-04-10 | End: 2020-04-10 | Stop reason: SURG

## 2020-04-10 RX ORDER — OXYCODONE HYDROCHLORIDE 5 MG/1
5 TABLET ORAL EVERY 4 HOURS PRN
Status: DISPENSED | OUTPATIENT
Start: 2020-04-10 | End: 2020-04-11

## 2020-04-10 RX ORDER — VITAMIN A ACETATE, BETA CAROTENE, ASCORBIC ACID, CHOLECALCIFEROL, .ALPHA.-TOCOPHEROL ACETATE, DL-, THIAMINE MONONITRATE, RIBOFLAVIN, NIACINAMIDE, PYRIDOXINE HYDROCHLORIDE, FOLIC ACID, CYANOCOBALAMIN, CALCIUM CARBONATE, FERROUS FUMARATE, ZINC OXIDE, CUPRIC OXIDE 3080; 12; 120; 400; 1; 1.84; 3; 20; 22; 920; 25; 200; 27; 10; 2 [IU]/1; UG/1; MG/1; [IU]/1; MG/1; MG/1; MG/1; MG/1; MG/1; [IU]/1; MG/1; MG/1; MG/1; MG/1; MG/1
1 TABLET, FILM COATED ORAL EVERY MORNING
Status: DISCONTINUED | OUTPATIENT
Start: 2020-04-10 | End: 2020-04-12 | Stop reason: HOSPADM

## 2020-04-10 RX ORDER — ONDANSETRON 2 MG/ML
4 INJECTION INTRAMUSCULAR; INTRAVENOUS
Status: CANCELLED | OUTPATIENT
Start: 2020-04-10

## 2020-04-10 RX ORDER — HALOPERIDOL 5 MG/ML
1 INJECTION INTRAMUSCULAR
Status: CANCELLED | OUTPATIENT
Start: 2020-04-10

## 2020-04-10 RX ORDER — DIPHENHYDRAMINE HYDROCHLORIDE 50 MG/ML
12.5 INJECTION INTRAMUSCULAR; INTRAVENOUS
Status: CANCELLED | OUTPATIENT
Start: 2020-04-10

## 2020-04-10 RX ORDER — OXYCODONE HYDROCHLORIDE 10 MG/1
10 TABLET ORAL EVERY 4 HOURS PRN
Status: ACTIVE | OUTPATIENT
Start: 2020-04-10 | End: 2020-04-11

## 2020-04-10 RX ORDER — KETOROLAC TROMETHAMINE 30 MG/ML
30 INJECTION, SOLUTION INTRAMUSCULAR; INTRAVENOUS EVERY 6 HOURS
Status: DISPENSED | OUTPATIENT
Start: 2020-04-10 | End: 2020-04-10

## 2020-04-10 RX ORDER — MEPERIDINE HYDROCHLORIDE 25 MG/ML
12.5 INJECTION INTRAMUSCULAR; INTRAVENOUS; SUBCUTANEOUS
Status: CANCELLED | OUTPATIENT
Start: 2020-04-10

## 2020-04-10 RX ORDER — SCOLOPAMINE TRANSDERMAL SYSTEM 1 MG/1
PATCH, EXTENDED RELEASE TRANSDERMAL PRN
Status: DISCONTINUED | OUTPATIENT
Start: 2020-04-10 | End: 2020-04-10 | Stop reason: SURG

## 2020-04-10 RX ORDER — HYDROMORPHONE HYDROCHLORIDE 1 MG/ML
0.4 INJECTION, SOLUTION INTRAMUSCULAR; INTRAVENOUS; SUBCUTANEOUS
Status: CANCELLED | OUTPATIENT
Start: 2020-04-10

## 2020-04-10 RX ORDER — OXYCODONE HCL 5 MG/5 ML
10 SOLUTION, ORAL ORAL
Status: CANCELLED | OUTPATIENT
Start: 2020-04-10

## 2020-04-10 RX ORDER — DIPHENHYDRAMINE HYDROCHLORIDE 50 MG/ML
12.5 INJECTION INTRAMUSCULAR; INTRAVENOUS EVERY 6 HOURS PRN
Status: ACTIVE | OUTPATIENT
Start: 2020-04-10 | End: 2020-04-11

## 2020-04-10 RX ORDER — CEFAZOLIN SODIUM 1 G/3ML
INJECTION, POWDER, FOR SOLUTION INTRAMUSCULAR; INTRAVENOUS PRN
Status: DISCONTINUED | OUTPATIENT
Start: 2020-04-10 | End: 2020-04-10 | Stop reason: SURG

## 2020-04-10 RX ORDER — ACETAMINOPHEN 325 MG/1
325 TABLET ORAL EVERY 4 HOURS PRN
Status: DISCONTINUED | OUTPATIENT
Start: 2020-04-10 | End: 2020-04-12 | Stop reason: HOSPADM

## 2020-04-10 RX ORDER — DIPHENHYDRAMINE HYDROCHLORIDE 50 MG/ML
25 INJECTION INTRAMUSCULAR; INTRAVENOUS EVERY 6 HOURS PRN
Status: ACTIVE | OUTPATIENT
Start: 2020-04-10 | End: 2020-04-11

## 2020-04-10 RX ORDER — ONDANSETRON 2 MG/ML
INJECTION INTRAMUSCULAR; INTRAVENOUS PRN
Status: DISCONTINUED | OUTPATIENT
Start: 2020-04-10 | End: 2020-04-10 | Stop reason: SURG

## 2020-04-10 RX ORDER — ONDANSETRON 4 MG/1
4 TABLET, ORALLY DISINTEGRATING ORAL EVERY 6 HOURS PRN
Status: DISCONTINUED | OUTPATIENT
Start: 2020-04-10 | End: 2020-04-12 | Stop reason: HOSPADM

## 2020-04-10 RX ORDER — HYDROMORPHONE HYDROCHLORIDE 1 MG/ML
0.4 INJECTION, SOLUTION INTRAMUSCULAR; INTRAVENOUS; SUBCUTANEOUS
Status: ACTIVE | OUTPATIENT
Start: 2020-04-10 | End: 2020-04-11

## 2020-04-10 RX ORDER — DEXAMETHASONE SODIUM PHOSPHATE 4 MG/ML
INJECTION, SOLUTION INTRA-ARTICULAR; INTRALESIONAL; INTRAMUSCULAR; INTRAVENOUS; SOFT TISSUE PRN
Status: DISCONTINUED | OUTPATIENT
Start: 2020-04-10 | End: 2020-04-10 | Stop reason: SURG

## 2020-04-10 RX ADMIN — Medication 100 MCG: at 01:03

## 2020-04-10 RX ADMIN — LIDOCAINE HYDROCHLORIDE,EPINEPHRINE BITARTRATE 6 ML: 20; .005 INJECTION, SOLUTION EPIDURAL; INFILTRATION; INTRACAUDAL; PERINEURAL at 00:15

## 2020-04-10 RX ADMIN — DOCUSATE SODIUM 100 MG: 100 CAPSULE, LIQUID FILLED ORAL at 14:56

## 2020-04-10 RX ADMIN — CEFAZOLIN 2 G: 330 INJECTION, POWDER, FOR SOLUTION INTRAMUSCULAR; INTRAVENOUS at 00:14

## 2020-04-10 RX ADMIN — LIDOCAINE HYDROCHLORIDE,EPINEPHRINE BITARTRATE 10 ML: 20; .005 INJECTION, SOLUTION EPIDURAL; INFILTRATION; INTRACAUDAL; PERINEURAL at 00:09

## 2020-04-10 RX ADMIN — METHYLERGONOVINE MALEATE 200 MCG: 0.2 INJECTION, SOLUTION INTRAMUSCULAR; INTRAVENOUS at 00:41

## 2020-04-10 RX ADMIN — Medication 100 MCG: at 01:00

## 2020-04-10 RX ADMIN — Medication 200 MCG: at 00:53

## 2020-04-10 RX ADMIN — Medication 100 MCG: at 00:50

## 2020-04-10 RX ADMIN — OXYTOCIN 20 UNITS: 10 INJECTION, SOLUTION INTRAMUSCULAR; INTRAVENOUS at 00:38

## 2020-04-10 RX ADMIN — ONDANSETRON 4 MG: 2 INJECTION INTRAMUSCULAR; INTRAVENOUS at 00:48

## 2020-04-10 RX ADMIN — Medication 100 MCG: at 00:44

## 2020-04-10 RX ADMIN — SODIUM CHLORIDE, SODIUM GLUCONATE, SODIUM ACETATE, POTASSIUM CHLORIDE AND MAGNESIUM CHLORIDE: 526; 502; 368; 37; 30 INJECTION, SOLUTION INTRAVENOUS at 00:14

## 2020-04-10 RX ADMIN — LIDOCAINE HYDROCHLORIDE,EPINEPHRINE BITARTRATE 2 ML: 20; .005 INJECTION, SOLUTION EPIDURAL; INFILTRATION; INTRACAUDAL; PERINEURAL at 00:21

## 2020-04-10 RX ADMIN — GENTAMICIN SULFATE 311 MG: 40 INJECTION, SOLUTION INTRAMUSCULAR; INTRAVENOUS at 06:01

## 2020-04-10 RX ADMIN — DEXAMETHASONE SODIUM PHOSPHATE 8 MG: 4 INJECTION, SOLUTION INTRA-ARTICULAR; INTRALESIONAL; INTRAMUSCULAR; INTRAVENOUS; SOFT TISSUE at 00:48

## 2020-04-10 RX ADMIN — Medication 100 MCG: at 00:27

## 2020-04-10 RX ADMIN — MISOPROSTOL 1000 MCG: 200 TABLET ORAL at 01:20

## 2020-04-10 RX ADMIN — Medication 100 MCG: at 01:05

## 2020-04-10 RX ADMIN — OXYTOCIN 1000 ML: 10 INJECTION, SOLUTION INTRAMUSCULAR; INTRAVENOUS at 01:07

## 2020-04-10 RX ADMIN — Medication 100 MCG: at 00:56

## 2020-04-10 RX ADMIN — KETOROLAC TROMETHAMINE 30 MG: 30 INJECTION, SOLUTION INTRAMUSCULAR at 18:26

## 2020-04-10 RX ADMIN — KETOROLAC TROMETHAMINE 30 MG: 30 INJECTION, SOLUTION INTRAMUSCULAR at 12:42

## 2020-04-10 RX ADMIN — ACETAMINOPHEN 1000 MG: 500 TABLET ORAL at 21:10

## 2020-04-10 RX ADMIN — ACETAMINOPHEN 1000 MG: 500 TABLET ORAL at 08:47

## 2020-04-10 RX ADMIN — AMPICILLIN SODIUM 2000 MG: 2 INJECTION, POWDER, FOR SOLUTION INTRAMUSCULAR; INTRAVENOUS at 06:49

## 2020-04-10 RX ADMIN — Medication 100 MCG: at 00:47

## 2020-04-10 RX ADMIN — ACETAMINOPHEN 1000 MG: 500 TABLET ORAL at 03:53

## 2020-04-10 RX ADMIN — FENTANYL CITRATE 100 MCG: 50 INJECTION, SOLUTION INTRAMUSCULAR; INTRAVENOUS at 00:14

## 2020-04-10 RX ADMIN — SCOLOPAMINE TRANSDERMAL SYSTEM 1 PATCH: 1 PATCH, EXTENDED RELEASE TRANSDERMAL at 00:48

## 2020-04-10 RX ADMIN — MORPHINE SULFATE 1.5 MG: 0.5 INJECTION, SOLUTION EPIDURAL; INTRATHECAL; INTRAVENOUS at 00:48

## 2020-04-10 RX ADMIN — Medication 100 MCG: at 00:41

## 2020-04-10 RX ADMIN — KETOROLAC TROMETHAMINE 30 MG: 30 INJECTION, SOLUTION INTRAMUSCULAR at 06:48

## 2020-04-10 RX ADMIN — Medication 4 ML: at 00:09

## 2020-04-10 RX ADMIN — KETOROLAC TROMETHAMINE 30 MG: 30 INJECTION, SOLUTION INTRAMUSCULAR at 01:04

## 2020-04-10 RX ADMIN — ACETAMINOPHEN 1000 MG: 500 TABLET ORAL at 14:56

## 2020-04-10 RX ADMIN — VITAMIN A, VITAMIN C, VITAMIN D-3, VITAMIN E, VITAMIN B-1, VITAMIN B-2, NIACIN, VITAMIN B-6, CALCIUM, IRON, ZINC, COPPER 1 TABLET: 4000; 120; 400; 22; 1.84; 3; 20; 10; 1; 12; 200; 27; 25; 2 TABLET ORAL at 08:48

## 2020-04-10 ASSESSMENT — PAIN SCALES - GENERAL: PAIN_LEVEL: 0

## 2020-04-10 NOTE — OP REPORT
DATE OF SERVICE:  04/10/2020    PREOPERATIVE DIAGNOSES:   1.  Intrauterine pregnancy at 41+2 weeks gestation.  2.  Group B streptococcus negative.  3.  Active labor.  4.  Arrest of descent and dilation.  5.  Likely cephalopelvic disproportion.  6.  Abnormal fetal heart rate, remote from delivery.  7.  Chorioamnionitis.    POSTOPERATIVE DIAGNOSES:  1.  Intrauterine pregnancy at 41+2 weeks gestation.  2.  Group B streptococcus negative.  3.  Active labor.  4.  Arrest of descent and dilation.  5.  Likely cephalopelvic disproportion.  6.  Abnormal fetal heart rate, remote from delivery.  7.  Chorioamnionitis.    PROCEDURE:  Primary low transverse  section via Pfannenstiel skin   incision.    SURGEON:  Sulma Hernandez MD    ASSISTANT:   ____ Jose.     ANESTHESIOLOGIST:  Cornelius Pineda MD    ANESTHESIA:  Spinal.    SPECIMEN:  Cord gases and placenta.    ESTIMATED BLOOD LOSS:  900 mL.    FINDINGS:  Viable female infant in the ROP position with Apgars of 8 at one   minute and 9 at five minutes, weighing 3630 g, 8 pounds 0 ounces,   meconium-stained fluid noted.  No nuchal cord noted.  Normal uterus, normal   bilateral tubes and ovaries.    Cord gases -- arterial pH 7.34, base excess -4; venous pH 7.39, base excess   -3.    COMPLICATIONS:  None apparent.    INDICATIONS FOR THE PROCEDURE:  The patient was admitted as a 24-year-old    1, para 0, admitted at 41+1 weeks gestation based upon her LMP   consistent with an 11-week ultrasound who presented to labor and delivery in   the early morning of 2020 with the report of contractions.  At the time   of admission, her cervix was 2 cm, 80% effaced, -2 station and after   ambulating for an hour she was 3 cm, 100% effaced, -2 station.  The patient   was noted to be GBS negative.  The patient was started on IV Pitocin for labor   augmentation.  The patient underwent artificial rupture of membranes of clear   fluid on 2020 at 12:27 p.m. and  MORNING CARE 

PATIENT REFUSED MORNING CARE, LINEN CHANGE AND GOWN CHANGE AT THIS TIME. STATES, " ID RATHER 
DO IT LATER." CLEAN LINEN LEFT AT BEDSIDE. at that time, her cervix was 4 cm, 90%   effaced, -1 station.  At approximately 5:51 p.m., her cervix was 4 cm, 100%   effaced, -1 station.  She was reexamined an hour later and found to be 5 cm,   100% effaced, -1 station.  The patient was noted to have category 1-2 tracing   during stage I labor and at approximately 11 p.m. on 2020, I was asked   to evaluate the patient for possible repetitive late decelerations.  The   patient's cervix was examined and she was found to be 5 cm, 100% effaced, -2   station with caput and molding.  The fetal heart rate was 140 beats per minute   with accelerations to the 160s and repetitive late decelerations to the 90   and 100 beats per minute with each contraction.  Given the abnormal fetal   heart rate remote from delivery, likely cephalopelvic disproportion, lack of   cervical dilatation and descent as well as a temperature spike to 101, I did   recommend that the patient undergo a primary low transverse  section   via Pfannenstiel skin incision.    DETAILS OF THE PROCEDURE:  The patient was taken to the operating room where   her epidural was rebolused.  The patient was then prepped and draped in the   dorsal supine position with leftward tilt.  Her epidural anesthesia was found   to be adequate.  A Pfannenstiel skin incision was made with a scalpel.  The   incision was carried down to the level of the fascia.  The fascia was incised   in the midline.  The fascial incision was extended laterally with Roper   scissors.  The rectus muscles were dissected off the fascia in the usual   fashion.  The rectus muscles were divided in the midline.  Areas of bleeding   were cauterized with electrocautery.  The peritoneum was identified, grasped   with hemostats, and entered sharply with Metzenbaum scissors.  The incision   was extended superiorly and inferiorly with good visualization of the bladder.    The peritoneum was stretched.  The Hakan O retractor was placed within  the   patient's pelvis and abdomen.  The vesicouterine peritoneum was identified,   grasped with pickups and entered sharply with Metzenbaum scissors.  The   incision was extended laterally with Metzenbaum scissors and the bladder flap   was created digitally.  A low segment transverse incision was made with the   scalpel.  The incision was extended laterally with blunt dissection.    Meconium-stained fluid was encountered.  The vertex was noted to be in the ROP   position.  The vertex was rotated, flexed and delivered through the uterine   incision.  There was no nuchal cord noted.  The infant's mouth and nose were   bulb suctioned.  The infant's cord was clamped and cut, and the infant was   handed over to the awaiting nursing team.  A segment of cord was clamped and   cut for cord gas and the results are noted as above.    The placenta delivered intact with a 3-vessel cord.  There was uterine atony   noted.  The uterus was cleared of amniotic fluid, blood clots and membranes   with moistened laparotomy sponges.  The uterine incision was grasped with   Arana clamps and ring forceps and reapproximated with 0 Vicryl in a   running locked fashion.  A second layer of the same suture was used in a   horizontal imbricating fashion.  She continued to have uterine atony and   received IM Methergine.  There were several small areas of bleeding noted on   the midline of the uterine incision and these were made hemostatic with a   series of figure-of-X 0 Vicryl suture.  The uterine incision was copiously   irrigated with sterile water and while there was no active bleeding, the   serosal surface of the uterus did appear to have a small amount of oozing from   the sutures.  These were lightly cauterized with electrocautery and Francy   was placed across the incision.    The Hakan O retractor was removed from the patient's pelvis and abdomen.  The   peritoneum was grasped with hemostats and the peritoneum was  reapproximated   with 2-0 Vicryl.  The rectus muscles were reapproximated with 2-0 Vicryl in a   vertical mattress suture fashion.  The rectus muscles and fascia were examined   and found to be hemostatic and small areas of bleeding were cauterized with   electrocautery.  The fascia was reapproximated with 0 PDS.  The subcutaneous   tissues were irrigated with sterile water, areas of bleeding cauterized with   electrocautery.  The subcutaneous tissues were reapproximated with 3-0 Vicryl   in a 2-layer closure; the first layer reapproximating Babar fascia, the   second layer to bring the skin edges in closer proximity.  The skin was closed   with 4-0 Monocryl in a subcuticular fashion.  Bimanual uterine massage and   exploration was performed and small clots were removed from the lower uterine   segment.  The patient received 1000 mcg of Cytotec per rectum x1.  Her wound   was dressed with benzoin, Steri-Strips and a Mepilex dressing.  The patient   was transferred to the PACU in stable condition.  Sponge, lap and needle   counts were correct x2.       ____________________________________     MD TARA MIRANDA / NTS    DD:  04/10/2020 01:43:49  DT:  04/10/2020 03:47:43    D#:  [id number]  Job#:  [id number]

## 2020-04-10 NOTE — PROGRESS NOTES
"Pharmacy Kinetics 24 y.o. female on gentamicin day # 1  4/10/2020    Dosing Weight: 62.2 kg ***  Currently on Gentamicin 311 mg iv q24hr    Indication for treatment: Chorioamnionitis    Pertinent history per medical record: Admitted on 2020 for IUP at 41w1d in active labor with arrest of dilation and descent and abnormal fetal heart rate remote from delivery and likely CPD now s/p  and intra-op diagnosis of chorioamnionitis. .    Other antibiotics: ampicillin 2g q6h    Allergies: Patient has no known allergies.     List concerns for renal function: No BMP currently to evaluate renal and hepatic function, concurrent ketorolac    Pertinent cultures to date:   none    Recent Labs     20  0815   WBC 14.3*   NEUTSPOLYS 85.50*     Intake/Output Summary (Last 24 hours) at 4/10/2020 0835  Last data filed at 4/10/2020 0719  Gross per 24 hour   Intake 1560 ml   Output 2440 ml   Net -880 ml      Blood Pressure 106/69   Pulse 88   Temperature 36.6 °C (97.8 °F) (Tympanic)   Respiration 16   Height 1.499 m (4' 11\")   Weight 88 kg (194 lb)   Oxygen Saturation 93%  Temp (24hrs), Av.7 °C (98.1 °F), Min:36.2 °C (97.2 °F), Max:37.1 °C (98.8 °F)      A/P   1. Gentamicin dose change: not indicated at this terry  2. Next gentamicin level: pending continuation beyond 48 hours or as clinically indicated  3. Goal trough: undetectable  4. Comments: Patient clinically stable and afebrile, although has leukocytosis. No BMP to evaluate renal function. Will f/u on clinical status, labs, and provider plans to optimize therapy.     Nola Lobo, PharmD      "

## 2020-04-10 NOTE — PROGRESS NOTES
190: Received report from Palak FREEMAN. Dr Dumas at bedside, SVE 5/100/-1. Pt reporting increase contraction pain, bolus button pushed. FOB at bedside. POC discussed.     : Pt reporting continued contraction pain. Dr Pineda at bedside for epidural bolus.     : Repetitive decelerations noted. Dr Keenan called with update. Order to halve pitocin from 16 to 8.     : Call made to Dr Keenan regarding continued repetitive late decelerations. Awaiting call back. SVE as noted.    : Dr Keenan at bedside. SVE as noted. Decision for  delivery made. Pt prepped for surgery. Plan to go back to OR after current case finished.     232: Dr Keenan made aware of pt temperature 101.4. Received orders for 2g ampicillin and gentamycin.     0014: Pt into OR 2.     0037: Delivery of viable female infant. Apgars 8,9.     0125: Pt in PACU 3.     0240: Per order, placenta down to lab with Cyn BENJAMIN.     0300: Pt up to postparum via gurney. Report given to Marybel FREEMAN. POC discussed.

## 2020-04-10 NOTE — PROGRESS NOTES
FHR Cat I-II (mild decels)  Ctx now stronger and more consistent.  cx changed to 5/100%/-1  EFW 3600g    PLAN:  Continue labor.

## 2020-04-10 NOTE — LACTATION NOTE
Mom and dad alert and dad cuddling baby. Both parents state that baby is nursing well. Mom states baby has nursed 4 times since birth and she denies discomfort.     Mom denied any questions or concerns. Mom encouraged to call Lactation for next feeding for positioning and latch assistance. Lactation will continue to f/u daily.

## 2020-04-10 NOTE — PROGRESS NOTES
"L and D - PROGRESS NOTE    S:  Doing ok.  Asked to evaluate the patient for possible repetitive late decelerations.  Patient does not have a fetal scalp electrode.  Discussed need for cervical examination and placement of FSE.  She agrees to the plan of care.  Discussed the fact that her cervix has not dilated in over 4 hours, that there is now caput and molding of the fetal vertex, and that the fetal heart rate continues to demonstrates FHR late decelerations.  Risks, benefits, and alternatives of a primary LTCS were discussed with the patient and she agrees to proceed.  Questions answered.  She is bloodless and does not accept a blood transfusion.    O:  /55   Pulse (!) 112   Temp 36.2 °C (97.2 °F) (Temporal)   Resp 18   Ht 1.499 m (4' 11\")   Wt 88 kg (194 lb)   SpO2 96%     A, A, and O x 3 NAD    SVE: 5-6/100%/-2/caput/molding    IUPC: every 2-4 minutes.  MVUs 180-200.      FHR: 140 bpm with accelerations to 160 bpm and repetitive late decelerations to  bpm with each contraction.    Recent Labs     04/09/20  0815   WBC 14.3*   RBC 4.10*   HEMOGLOBIN 13.0   HEMATOCRIT 39.3   MCV 95.9   MCH 31.7   RDW 46.6   PLATELETCT 163*   MPV 12.3   NEUTSPOLYS 85.50*   LYMPHOCYTES 8.80*   MONOCYTES 4.30   EOSINOPHILS 0.50   BASOPHILS 0.30     A/P: IUP at 41w1d in active labor with arrest of dilation and descent and abnormal fetal heart rate remote from delivery and likely CPD.    1.  To OR for primary LTCS.  Risks, benefits, and alternatives discussed and she agrees to proceed.  2.  Bloodless program.    "

## 2020-04-10 NOTE — PROGRESS NOTES
POD 0.3 ---  , 8 hours postop    Pt. Got both IV ampicillin and gentamycin this AM, afebrile thru labor and PP except for a single temp 101.4 just before delivery (noted in nurse's note, not documented in vital signs).    Afebrile, VS normal, UO adequate  No flatus yet, tolerating clear liquids  Well, denies N/V    LAB:  PP CBC later today   2020 08:15   WBC 14.3 (H)   Hemoglobin 13.0   Hematocrit 39.3   Platelet Count 163 (L)       PE:     Lungs clear to auscultation  Abdomen soft, flat, bowel sounds present and normal  Wound dressing in place, waterproof barrier intact  Calves nontender, Danielito sign negative bilaterally  Back:  No CVA tenderness     PLAN: DC antibiotics. Postop care, advance diet as tolerated, increase activity as tolerated.

## 2020-04-10 NOTE — PROGRESS NOTES
"Pharmacy Kinetics 24 y.o. female on gentamicin day # 1 2020    Dosing Weight: 62.2 kg  (88 kg actual weight - 9 kg +45.45 ideal weight) / 2 = 62.2 kg    Currently on Gentamicin 311 mg iv q24hr    Indication for treatment and pertinent history from medical record: IUP at 41w1d in active labor with arrest of dilation and descent and abnormal fetal heart rate remote from delivery and likely CPD    Other antibiotics: ampicillin 2g q6h    Allergies: Patient has no known allergies.     Concerns for renal function: none      Recent Labs     20  0815   WBC 14.3*   NEUTSPOLYS 85.50*     /55   Pulse (!) 112   Temp 36.2 °C (97.2 °F) (Temporal)   Resp 18   Ht 1.499 m (4' 11\")   Wt 88 kg (194 lb)   SpO2 96%  Temp (24hrs), Av.4 °C (97.5 °F), Min:35.9 °C (96.7 °F), Max:36.7 °C (98.1 °F)    A/P   1. Gentamicin dose change: 311 mg q24h  2. Comments: Clinical pharmacist to determine need for level if therapy continues.    Muriel Patel, PharmD      "

## 2020-04-10 NOTE — CARE PLAN
Problem: Safety  Goal: Will remain free from injury  Outcome: PROGRESSING AS EXPECTED  Note: Pt is bloodless. Consent forms signed and in chart, proper stickers on pt chart, and bloodless band on pt wrist.      Problem: Infection  Goal: Will remain free from infection  Outcome: PROGRESSING AS EXPECTED  Note: Pt is GBS negative. No signs or symptoms of infection at this time.

## 2020-04-10 NOTE — ANESTHESIA TIME REPORT
Anesthesia Start and Stop Event Times     Date Time Event    2020 1307 Ready for Procedure     1316 Anesthesia Start    4/10/2020 0130 Anesthesia Stop        Responsible Staff  20 to 04/10/20    Name Role Begin End    Cornelius Pineda M.D. Anesth 1316 0130        Preop Diagnosis (Free Text):  Pre-op Diagnosis     Fetal intolerance to labor        Preop Diagnosis (Codes):    Post op Diagnosis  Pain during labor  shepard pregnancy at 41 weeks gestation, epidural for labor pain,  for fetal intolerance to labor    Premium Reason  A. 3PM - 7AM    Comments: Labor epidural: 1618-0866;   time: 0863-9164

## 2020-04-10 NOTE — PROGRESS NOTES
Dr. Dumas updated with patient status and VS. Per MD okay to continue with routine care at this time. No new orders.

## 2020-04-10 NOTE — OR SURGEON
Immediate Post OP Note    PreOp Diagnosis:    1.  IUP at 41w2d.  2.  GBS negative.  3.  Active labor.  4.  Arrest of dilation and descent.  5.  Chorioamnionitis.  6.  Likely CPD.  7.  Abnormal fetal heart rate remote from delivery.    PostOp Diagnosis: as above.    Procedure(s):   SECTION, PRIMARY  PLACEMENT OF YARON    Surgeon(s):  Sulma Hernandez M.D.    Assistant:   Radha Garcia MD    Anesthesiologist/Type of Anesthesia:  Anesthesiologist: Cornelius Pineda M.D./Spinal    Surgical Staff:  * No surgical staff found *    Specimens removed if any:  1.  Cord gases. 2.  Placenta.    Estimated Blood Loss: 900 cc    Findings: viable female infant in the ROP position with APGARS of 8 at one minute and 9 at five minutes.  Meconium stained fluid.  No nuchal cord.    Infant weight - 3630 grams (8 pounds 0 ozs).    Normal uterus.  Normal bilateral tubes.  Normal bilateral ovaries.    Results for ALYSIA NARVAEZ GIRL (MRN [id number]) as of 4/10/2020 01:26   Ref. Range 4/10/2020 00:40   Cord Bg Ph Unknown 7.34   Cord Bg Pco2 Latest Units: mmHg 41.7   Cord Bg Po2 Latest Units: mmHg 11.6   Cord Bg Hco3 Latest Units: mmol/L 22   Cord Bg Base Excess Latest Units: mmol/L -4   Cord Bg O2 Saturation Latest Units: % 22.2   CV Ph Unknown 7.39   CV Pco2 Latest Units: mmHg 36.0   CV Po2 Unknown 19.8   CV Hco3 Latest Units: mmol/L 21   CV Base Excess Latest Units: mmol/L -3   CV O2 Saturation Latest Units: % 47.2       Complications: none apparent.        4/10/2020 1:25 AM Sulma Hernandez M.D.

## 2020-04-10 NOTE — CARE PLAN
Problem: Communication  Goal: The ability to communicate needs accurately and effectively will improve  Outcome: PROGRESSING AS EXPECTED  Intervention: [place] patient and significant other/support system to call light to alert staff of needs  Flowsheets (Taken 4/10/2020 0628)  Oriented to::   All of the Following : Location of Bathroom, Visiting Policy, Unit Routine, Call Light and Bedside Controls, Bedside Rail Policy, Smoking Policy, Rights and Responsibilities, Bedside Report, and Patient Education Notebook   Visiting Policy   Call Light & Bedside Controls   Location of bathroom   Unit Routine   Bedside Rail Policy   Patient Rights and Responsibilities   Bedside Report  Note: Pt and SO oriented to room, unit, plan of care, safety and call light, questions answered     Problem: Potential for postpartum infection related to surgical incision, compromised uterine condition, urinary tract or respiratory compromise  Goal: Patient will be afebrile and free from signs and symptoms of infection  Outcome: PROGRESSING AS EXPECTED  Intervention: Give antibiotics as indicated and ordered by MD/DO/CNM  Note: Antibiotics given as per MD order, pt afebrile at this time

## 2020-04-10 NOTE — PROGRESS NOTES
Pt received from L&D RN via gurney, transferred to bed using hovermat, pt tolerated well. Bedside report received from L&D RN. Continuous pulse oximetry in place per dura protocol. Vo catheter draining clear yellow urine to gravity without difficulty. Fundus firm, lochia light, no clots noted, pad changed. Pt and SO oriented to room, unit, plan of care, safety and security, bed controls, and call light, questions answered, lactation support provided. Continuing to monitor. Josefa Diaz R.N.

## 2020-04-10 NOTE — PROGRESS NOTES
FHR Cat I  ctx Q 2-3 min on pitocin  Comfortable with epidural  cx minimally-changed, 4/100%/-1    PLAN:  Increase pitocin, reassess later.  Discussed the possibility of needing .

## 2020-04-11 PROCEDURE — 700112 HCHG RX REV CODE 229: Performed by: OBSTETRICS & GYNECOLOGY

## 2020-04-11 PROCEDURE — 700102 HCHG RX REV CODE 250 W/ 637 OVERRIDE(OP): Performed by: OBSTETRICS & GYNECOLOGY

## 2020-04-11 PROCEDURE — 700102 HCHG RX REV CODE 250 W/ 637 OVERRIDE(OP): Performed by: ANESTHESIOLOGY

## 2020-04-11 PROCEDURE — A9270 NON-COVERED ITEM OR SERVICE: HCPCS | Performed by: OBSTETRICS & GYNECOLOGY

## 2020-04-11 PROCEDURE — A9270 NON-COVERED ITEM OR SERVICE: HCPCS | Performed by: ANESTHESIOLOGY

## 2020-04-11 PROCEDURE — 770002 HCHG ROOM/CARE - OB PRIVATE (112)

## 2020-04-11 RX ADMIN — IBUPROFEN 600 MG: 600 TABLET ORAL at 12:42

## 2020-04-11 RX ADMIN — IBUPROFEN 600 MG: 600 TABLET ORAL at 00:37

## 2020-04-11 RX ADMIN — IBUPROFEN 600 MG: 600 TABLET ORAL at 06:16

## 2020-04-11 RX ADMIN — OXYCODONE HYDROCHLORIDE AND ACETAMINOPHEN 1 TABLET: 10; 325 TABLET ORAL at 05:02

## 2020-04-11 RX ADMIN — IBUPROFEN 600 MG: 600 TABLET ORAL at 23:36

## 2020-04-11 RX ADMIN — VITAMIN A, VITAMIN C, VITAMIN D-3, VITAMIN E, VITAMIN B-1, VITAMIN B-2, NIACIN, VITAMIN B-6, CALCIUM, IRON, ZINC, COPPER 1 TABLET: 4000; 120; 400; 22; 1.84; 3; 20; 10; 1; 12; 200; 27; 25; 2 TABLET ORAL at 12:42

## 2020-04-11 RX ADMIN — OXYCODONE HYDROCHLORIDE 5 MG: 5 TABLET ORAL at 01:22

## 2020-04-11 RX ADMIN — ACETAMINOPHEN 325 MG: 325 TABLET, FILM COATED ORAL at 12:48

## 2020-04-11 RX ADMIN — DOCUSATE SODIUM 100 MG: 100 CAPSULE, LIQUID FILLED ORAL at 05:02

## 2020-04-11 RX ADMIN — OXYCODONE HYDROCHLORIDE AND ACETAMINOPHEN 1 TABLET: 5; 325 TABLET ORAL at 17:22

## 2020-04-11 RX ADMIN — HYDROCODONE BITARTRATE AND ACETAMINOPHEN 1 TABLET: 5; 325 TABLET ORAL at 21:32

## 2020-04-11 ASSESSMENT — EDINBURGH POSTNATAL DEPRESSION SCALE (EPDS)
THE THOUGHT OF HARMING MYSELF HAS OCCURRED TO ME: NEVER
I HAVE BLAMED MYSELF UNNECESSARILY WHEN THINGS WENT WRONG: NO, NEVER
I HAVE LOOKED FORWARD WITH ENJOYMENT TO THINGS: AS MUCH AS I EVER DID
I HAVE FELT SCARED OR PANICKY FOR NO GOOD REASON: NO, NOT AT ALL
I HAVE FELT SAD OR MISERABLE: NOT VERY OFTEN
THINGS HAVE BEEN GETTING ON TOP OF ME: NO, MOST OF THE TIME I HAVE COPED QUITE WELL
I HAVE BEEN ANXIOUS OR WORRIED FOR NO GOOD REASON: NO, NOT AT ALL
I HAVE BEEN ABLE TO LAUGH AND SEE THE FUNNY SIDE OF THINGS: AS MUCH AS I ALWAYS COULD
I HAVE BEEN SO UNHAPPY THAT I HAVE BEEN CRYING: ONLY OCCASIONALLY
I HAVE BEEN SO UNHAPPY THAT I HAVE HAD DIFFICULTY SLEEPING: NOT AT ALL

## 2020-04-11 NOTE — CARE PLAN
Problem: Potential for postpartum infection related to surgical incision, compromised uterine condition, urinary tract or respiratory compromise  Goal: Patient will be afebrile and free from signs and symptoms of infection  Outcome: PROGRESSING AS EXPECTED    Patient remains afebrile, no signs or symptoms of infection at this time.      Problem: Alteration in comfort related to surgical incision and/or after birth pains  Goal: Patient is able to ambulate, care for self and infant with acceptable pain level  Outcome: PROGRESSING AS EXPECTED     Patient with steady gait, able to ambulate and care for self. Updated on plan of care, no needs at this time.

## 2020-04-11 NOTE — CARE PLAN
Problem: Potential for postpartum infection related to surgical incision, compromised uterine condition, urinary tract or respiratory compromise  Goal: Patient will be afebrile and free from signs and symptoms of infection  4/10/2020 2019 by Josefa Diaz R.N.  Outcome: PROGRESSING AS EXPECTED  Intervention: Monitor vital signs and assess patient as directed in Intrapartum/Postpartum Standard of Care in Policy and Procedure manual  Note: IV antibiotics discontinued by MD, pt remains afebrile at this time without s/s infection noted on assessment     Problem: Pain Management  Goal: Pain level will decrease to patient's comfort goal  Outcome: PROGRESSING AS EXPECTED  Intervention: Educate and implement non-pharmacologic comfort measures. Examples: relaxation, distration, play therapy, activity therapy, massage, etc.  Note: Pt reports adequate pain relief with scheduled tylenol and toradol at this time, explained to pt that orders will be changing and pt will now get ibuprofen and tylenol prn with percocet as needed for breakthrough pain. Pt also given non-pharmacologic alternative options for pain control as well. Pt verbalizes understanding of pain management options at this time.

## 2020-04-11 NOTE — PROGRESS NOTES
POD1.5  ---      Afebrile, VS normal, UO adequate  + flatus, tolerating regular diet well, denies N/V    LAB:     2020 08:15 4/10/2020 08:45   WBC 14.3 (H) 22.7 (H)   Hemoglobin 13.0 10.6 (L)   Hematocrit 39.3 31.2 (L)   Platelet Count 163 (L) 151 (L)       PE:     Lungs clear to auscultation  Abdomen soft, flat, bowel sounds present and normal  Wound dressing in place, waterproof barrier intact  Calves nontender, Danielito sign negative bilaterally  Back:  No CVA tenderness     PLAN: Postop care, analgesia as needed, diet as tolerated,  activity as tolerated. Patient planning on discharge:  tomorrow .

## 2020-04-11 NOTE — CONSULTS
Follow up visit. Infant had been fussy at breast, but latched well during the day. Assisted mother with latch to right breast, in cradle hold. Used rolled up burp cloth with small washcloth and placed underneath breast tissue. Infant was able to latch on with wedging of breast tissue, and sustain latch for a few minutes. Mother did report she felt tugging, and LC heard swallows. Encouraged tummy to tummy and nose to nipple for better alignment for positioning.     Mother to try to latch infant on other breast. Encouraged her to call for support as needed.

## 2020-04-12 VITALS
OXYGEN SATURATION: 92 % | TEMPERATURE: 97.3 F | SYSTOLIC BLOOD PRESSURE: 102 MMHG | RESPIRATION RATE: 18 BRPM | WEIGHT: 194 LBS | DIASTOLIC BLOOD PRESSURE: 54 MMHG | HEART RATE: 72 BPM | HEIGHT: 59 IN | BODY MASS INDEX: 39.11 KG/M2

## 2020-04-12 PROBLEM — G89.18 POSTOPERATIVE PAIN: Status: ACTIVE | Noted: 2020-04-12

## 2020-04-12 PROCEDURE — A9270 NON-COVERED ITEM OR SERVICE: HCPCS | Performed by: OBSTETRICS & GYNECOLOGY

## 2020-04-12 PROCEDURE — 700112 HCHG RX REV CODE 229: Performed by: OBSTETRICS & GYNECOLOGY

## 2020-04-12 PROCEDURE — 700102 HCHG RX REV CODE 250 W/ 637 OVERRIDE(OP): Performed by: OBSTETRICS & GYNECOLOGY

## 2020-04-12 RX ORDER — IBUPROFEN 200 MG
200-600 TABLET ORAL EVERY 6 HOURS PRN
Qty: 30 TAB | COMMUNITY
Start: 2020-04-12

## 2020-04-12 RX ORDER — SIMETHICONE 80 MG
80 TABLET,CHEWABLE ORAL 4 TIMES DAILY PRN
Qty: 30 TAB | Refills: 3 | COMMUNITY
Start: 2020-04-12 | End: 2022-03-01

## 2020-04-12 RX ORDER — ACETAMINOPHEN 325 MG/1
325-650 TABLET ORAL EVERY 6 HOURS PRN
COMMUNITY
Start: 2020-04-12

## 2020-04-12 RX ORDER — VITAMIN A ACETATE, BETA CAROTENE, ASCORBIC ACID, CHOLECALCIFEROL, .ALPHA.-TOCOPHEROL ACETATE, DL-, THIAMINE MONONITRATE, RIBOFLAVIN, NIACINAMIDE, PYRIDOXINE HYDROCHLORIDE, FOLIC ACID, CYANOCOBALAMIN, CALCIUM CARBONATE, FERROUS FUMARATE, ZINC OXIDE, CUPRIC OXIDE 3080; 12; 120; 400; 1; 1.84; 3; 20; 22; 920; 25; 200; 27; 10; 2 [IU]/1; UG/1; MG/1; [IU]/1; MG/1; MG/1; MG/1; MG/1; MG/1; [IU]/1; MG/1; MG/1; MG/1; MG/1; MG/1
1 TABLET, FILM COATED ORAL EVERY MORNING
Qty: 30 TAB
Start: 2020-04-12 | End: 2022-03-01

## 2020-04-12 RX ORDER — OXYCODONE HYDROCHLORIDE AND ACETAMINOPHEN 5; 325 MG/1; MG/1
1 TABLET ORAL EVERY 6 HOURS PRN
Qty: 15 TAB | Refills: 0 | Status: SHIPPED | OUTPATIENT
Start: 2020-04-12 | End: 2020-04-16

## 2020-04-12 RX ORDER — PSEUDOEPHEDRINE HCL 30 MG
100 TABLET ORAL 2 TIMES DAILY PRN
Qty: 60 CAP | COMMUNITY
Start: 2020-04-12 | End: 2022-03-01

## 2020-04-12 RX ADMIN — VITAMIN A, VITAMIN C, VITAMIN D-3, VITAMIN E, VITAMIN B-1, VITAMIN B-2, NIACIN, VITAMIN B-6, CALCIUM, IRON, ZINC, COPPER 1 TABLET: 4000; 120; 400; 22; 1.84; 3; 20; 10; 1; 12; 200; 27; 25; 2 TABLET ORAL at 08:07

## 2020-04-12 RX ADMIN — ACETAMINOPHEN 325 MG: 325 TABLET, FILM COATED ORAL at 04:16

## 2020-04-12 RX ADMIN — HYDROCODONE BITARTRATE AND ACETAMINOPHEN 1 TABLET: 5; 325 TABLET ORAL at 04:16

## 2020-04-12 RX ADMIN — DOCUSATE SODIUM 100 MG: 100 CAPSULE, LIQUID FILLED ORAL at 04:16

## 2020-04-12 RX ADMIN — IBUPROFEN 600 MG: 600 TABLET ORAL at 08:06

## 2020-04-12 NOTE — LACTATION NOTE
This note was copied from a baby's chart.  MOB having some difficulty latching infant. MOB reported that infant had just fed. Review cues and feeding on cue with cluster feeding as normal to signal the milk in to her infant's needs. When infant crying, frenulum noted with a notched tongue on extension and poor lift. MOB is able to position infant into deep latch after multiple attempts. Encouraged to have discussion with PEDS MD about frenulum. Injoy kim for BF given with encouragement to access supportive videos and info for up to 1 year. Family voices understanding.

## 2020-04-12 NOTE — LACTATION NOTE
This note was copied from a baby's chart.  Follow-up visit, baby 41.2 weeks, C/S, baby over 2 days old. Mother has baby in clothes with pacifier in mouth, baby fussy. Discussed with mother behaviors of baby when she is  Hungry, mother seems indifferent, with information. Asked mother if she would like to latch baby because baby is showing hunger cues. Mother sat on edge of bed without support and attempted to latch baby, unable to latch. Ask mother if I could help reposition her in bed, mother reluctant. Mother states her incision hurts and she can't position herself sitting, mother repositioned herself in bed comfortable. Baby does have a notched tongue on extension, baby able to latch deep when breast is wedged. Assisted baby to right breast using cross cradle hold, skin to skin, obtained deep latch with coordinated sucks & swallows heard- see latch assessment score.     Teaching on hunger cues, breastfeeding when baby shows cues or by 3-4 hours from last feed, importance of skin to skin, positioning baby nipple to nose & cluster feeding. Mother knows how to hand express.    Contact information for OP lactation given.    Breastfeeding plan:  Breastfeed on cue or by 3-4 hours from last feed, may hand express & spoon feed back in addition to breastfeeding.

## 2020-04-12 NOTE — DISCHARGE SUMMARY
DATE OF ADMISSION:  2020    DATE OF DISCHARGE:  2020    ADMITTING DIAGNOSES:  1.  A 41 and 1/7 week gestation.    2.  Labor.    3.  Rh negative, nonsensitized.      DISCHARGE DIAGNOSES:  1.  A 41 and 2/7 week gestation, delivered.    2.  Labor.    3.  Rh negative, nonsensitized.    4.  Secondary arrest of dilatation and descent.    5.  Nonreassuring fetal heart rate findings.    6.  Intrapartum fever.      PROCEDURE:  Primary low transverse  section.      COMPLICATIONS:  None.      Baby:   female, weight 3630 g, 1-minute Apgar 8 and 5-minute Apgar 9.      PRESCRIPTIONS:    1.  Prenatal vitamin daily.    2.  Over-the-counter ibuprofen and/or Tylenol p.r.n. pain.    3.  Percocet 5/325 mg 1 every 6 hours p.r.n. severe pain.    4.  Continue all previous medicines, including inhalers as needed for asthma.      FOLLOW PLAN:  She will see me in 2 weeks.      This 24-year-old lady who is now   G1, P1.  She was admitted 8 days after her due date with spontaneous labor.    She ultimately arrested at 5.5 cm dilatation;  there were also worrisome   fetal heart monitor findings as well as a single elevated temperature just   prior to delivery.   was done.  She is discharged 2.5 days   postoperatively in satisfactory condition.  She received a short course of IV   ampicillin and gentamicin perioperatively, she defervesced promptly and did not receive ongoing   antibiotic therapy.  She has been afebrile postpartum.  Hemoglobin and   hematocrit were 10.6 and 31.2.  She has had normal return of bowel function.    She is tolerating a regular diet well.  Her waterproof dressing is intact and   will remain in place until her postoperative followup appointment in 2 weeks.       ____________________________________     MD CORKY DYE / SOPHIA    DD:  2020 07:04:50  DT:  2020 08:49:00    D#:  [id number]  Job#:  [id number]

## 2020-04-12 NOTE — CARE PLAN
Problem: Potential for postpartum infection related to surgical incision, compromised uterine condition, urinary tract or respiratory compromise  Goal: Patient will be afebrile and free from signs and symptoms of infection  Note: Patient is free from signs or symptoms of infection.      Problem: Alteration in comfort related to surgical incision and/or after birth pains  Goal: Patient is able to ambulate, care for self and infant with acceptable pain level  Note: Patient is able to ambulate     Problem: Potential knowledge deficit related to lack of understanding of self and  care  Goal: Patient will demonstrate ability to care for self and infant  Note: Patient demonstrates ability to care for self and infant

## 2020-04-12 NOTE — CARE PLAN
Problem: Altered physiologic condition related to postoperative  delivery  Goal: Patient physiologically stable as evidenced by normal lochia, palpable uterine involution and vital signs within normal limits  Outcome: PROGRESSING AS EXPECTED  Note: Fundus firm and lochia light, vital signs remain stable and normal.     Problem: Potential for postpartum infection related to surgical incision, compromised uterine condition, urinary tract or respiratory compromise  Goal: Patient will be afebrile and free from signs and symptoms of infection  Outcome: PROGRESSING AS EXPECTED     Problem: Alteration in comfort related to surgical incision and/or after birth pains  Goal: Patient is able to ambulate, care for self and infant with acceptable pain level  Outcome: PROGRESSING AS EXPECTED  Note: Patient is caring for self and infant independently, ambulating, makes needs known appropriately and requests pain medications as needed

## 2020-04-12 NOTE — DISCHARGE INSTRUCTIONS
POSTPARTUM DISCHARGE INSTRUCTIONS FOR MOM    YOB: 1995   Age: 24 y.o.               Admit Date: 2020     Discharge Date: 2020  Attending Doctor:  Clemente Dumas M.D.                  Allergies:  Patient has no known allergies.    Discharged to home by car. Discharged via wheelchair, hospital escort: Yes.  Special equipment needed: Not Applicable  Belongings with: Personal  Be sure to schedule a follow-up appointment with your primary care doctor or any specialists as instructed.     Discharge Plan:   Diet Plan: Discussed  Activity Level: Discussed  Confirmed Follow up Appointment: Patient to Call and Schedule Appointment  Confirmed Symptoms Management: Discussed  Medication Reconciliation Updated: Yes  Influenza Vaccine Indication: Patient Refuses    REASONS TO CALL YOUR OBSTETRICIAN:  1.   Persistent fever or shaking chills (Temperature higher than 100.4)  2.   Heavy bleeding (soaking more than 1 pad per hour); Passing clots  3.   Foul odor from vagina  4.   Mastitis (Breast infection; breast pain, chills, fever, redness)  5.   Urinary pain, burning or frequency  6.   Episiotomy infection  7.   Abdominal incision infection  8.   Severe depression longer than 24 hours    HAND WASHING  · Prior to handling the baby.  · Before breastfeeding or bottle feeding baby.  · After using the bathroom or changing the baby's diaper.    WOUND CARE  Ask your physician for additional care instructions.  In general:    ·  Incision:      · Keep clean and dry.    · Do NOT lift anything heavier than your baby for up to 6 weeks.    · There should not be any opening or pus.      VAGINAL CARE  · Nothing inside vagina for 6 weeks: no sexual intercourse, tampons or douching.  · Bleeding may continue for 2-4 weeks.  Amount may vary.    · Call your physician for heavy bleeding which means soaking more than 1 pad per hour    BIRTH CONTROL  · It is possible to become pregnant at any time after delivery and while  "breastfeeding.  · Plan to discuss a method of birth control with your physician at your follow up visit. visit.    DIET AND ELIMINATION  · Eating more fiber (bran cereal, fruits, and vegetables) and drinking plenty of fluids will help to avoid constipation.  · Urinary frequency after childbirth is normal.    POSTPARTUM BLUES  During the first few days after birth, you may experience a sense of the \"blues\" which may include impatience, irritability or even crying.  These feeling come and go quickly.  However, as many as 1 in 10 women experience emotional symptoms known as postpartum depression.    Postpartum depression:  May start as early as the second or third day after delivery or take several weeks or months to develop.  Symptoms of \"blues\" are present, but are more intense:  Crying spells; loss of appetite; feelings of hopelessness or loss of control; fear of touching the baby; over concern or no concern at all about the baby; little or no concern about your own appearance/caring for yourself; and/or inability to sleep or excessive sleeping.  Contact your physician if you are experiencing any of these symptoms.    Crisis Hotline:  · [place] Crisis Hotline:  4-343-EQOLYZE  Or 1-210.643.3387  · [place] Crisis Hotline:  1-717.620.8955  Or 171-986-5508    PREVENTING SHAKEN BABY:  If you are angry or stressed, PUT THE BABY IN THE CRIB, step away, take some deep breaths, and wait until you are calm to care for the baby.  DO NOT SHAKE THE BABY.  You are not alone, call a supporter for help.    · Crisis Call Center 24/7 crisis line 222-644-3735 or 1-605.591.8261  · You can also text them, text \"ANSWER\" to 581361    QUIT SMOKING/TOBACCO USE:  I understand the use of any tobacco products increases my chance of suffering from future heart disease and could cause other illnesses which may shorten my life. Quitting the use of tobacco products is the single most important thing I can do to improve my health. For further " information on smoking / tobacco cessation call a Toll Free Quit Line at 1-184.933.2760 (*National [employer] [place]) or 1-422.451.7519 (American Lung Association) or you can access the web based program at www.lungusa.org.    · [place] Tobacco Users Help Line:  (766) 525-4583       Toll Free: 1-234.155.3120  · Quit Tobacco Program [facility] (853)246-7329    DEPRESSION / SUICIDE RISK:  As you are discharged from this [facility], it is important to learn how to keep safe from harming yourself.    Recognize the warning signs:  · Abrupt changes in personality, positive or negative- including increase in energy   · Giving away possessions  · Change in eating patterns- significant weight changes-  positive or negative  · Change in sleeping patterns- unable to sleep or sleeping all the time   · Unwillingness or inability to communicate  · Depression  · Unusual sadness, discouragement and loneliness  · Talk of wanting to die  · Neglect of personal appearance   · Rebelliousness- reckless behavior  · Withdrawal from people/activities they love  · Confusion- inability to concentrate     If you or a loved one observes any of these behaviors or has concerns about self-harm, here's what you can do:  · Talk about it- your feelings and reasons for harming yourself  · Remove any means that you might use to hurt yourself (examples: pills, rope, extension cords, firearm)  · Get professional help from the community (Mental Health, Substance Abuse, psychological counseling)  · Do not be alone:Call your Safe Contact- someone whom you trust who will be there for you.  · Call your local CRISIS HOTLINE 316-4160 or 245-083-2287  · Call your local Children's Mobile Crisis Response Team [place] (178) 636-6293 or www.[employer]  · Call the toll free National Suicide Prevention Hotlines   · National Suicide Prevention Lifeline 915-806-JXEV (9122)  · National Hope Line Network 800-SUICIDE  (005-7098)    DISCHARGE SURVEY:  Thank you for choosing [facility].  We hope we provided you with very good care.  You may be receiving a survey in the mail.  Please fill it out.  Your opinion is valuable to us.    ADDITIONAL EDUCATIONAL MATERIALS GIVEN TO PATIENT:        My signature on this form indicates that:  1.  I have reviewed and understand the above information  2.  My questions regarding this information have been answered to my satisfaction.  3.  I have formulated a plan with my discharge nurse to obtain my prescribed medication for home.

## 2020-04-12 NOTE — PROGRESS NOTES
POD 2.5  ---      Afebrile, VS normal, UO adequate  + flatus, tolerating regular diet well, denies N/V    LAB:       2020 08:15 4/10/2020 08:45   WBC 14.3 (H) 22.7 (H)   Hemoglobin 13.0 10.6 (L)   Hematocrit 39.3 31.2 (L)   Platelet Count 163 (L) 151 (L)       PE:     Lungs clear to auscultation  Abdomen soft, flat, bowel sounds present and normal  Wound dressing in place, waterproof barrier intact  Calves nontender, Danielito sign negative bilaterally  Back:  No CVA tenderness     PLAN: Postop care, analgesia as needed, diet as tolerated,  activity as tolerated. Patient planning on discharge:  today .    Prescriptions:   PNV, OTC ibuprofen/tylenol, Percocet 5/325 #15 PRN.  Followup plans:   2 weeks .

## 2020-04-12 NOTE — PROGRESS NOTES
Assumed care. Assessment completed, fundus firm, lochia light. ABD incision with mepilex silver dressing intact. POC reviewed, verbalized understanding. Denies pain at this time, will call if pain med intervention needed.

## 2020-06-22 ENCOUNTER — PATIENT MESSAGE (OUTPATIENT)
Dept: MEDICAL GROUP | Facility: MEDICAL CENTER | Age: 25
End: 2020-06-22

## 2020-06-22 DIAGNOSIS — J45.909 REACTIVE AIRWAY DISEASE WITHOUT COMPLICATION, UNSPECIFIED ASTHMA SEVERITY, UNSPECIFIED WHETHER PERSISTENT: ICD-10-CM

## 2020-06-23 RX ORDER — ALBUTEROL SULFATE 90 UG/1
2 AEROSOL, METERED RESPIRATORY (INHALATION) EVERY 6 HOURS PRN
Qty: 8.5 G | Refills: 1 | Status: SHIPPED | OUTPATIENT
Start: 2020-06-23

## 2020-07-01 ENCOUNTER — HOSPITAL ENCOUNTER (EMERGENCY)
Facility: MEDICAL CENTER | Age: 25
End: 2020-07-01
Attending: EMERGENCY MEDICINE
Payer: COMMERCIAL

## 2020-07-01 ENCOUNTER — APPOINTMENT (OUTPATIENT)
Dept: RADIOLOGY | Facility: MEDICAL CENTER | Age: 25
End: 2020-07-01
Attending: EMERGENCY MEDICINE
Payer: COMMERCIAL

## 2020-07-01 VITALS
WEIGHT: 166.23 LBS | HEIGHT: 59 IN | DIASTOLIC BLOOD PRESSURE: 68 MMHG | OXYGEN SATURATION: 99 % | BODY MASS INDEX: 33.51 KG/M2 | TEMPERATURE: 99.1 F | SYSTOLIC BLOOD PRESSURE: 116 MMHG | HEART RATE: 71 BPM | RESPIRATION RATE: 18 BRPM

## 2020-07-01 DIAGNOSIS — R11.0 NAUSEA: ICD-10-CM

## 2020-07-01 DIAGNOSIS — E86.0 DEHYDRATION: ICD-10-CM

## 2020-07-01 DIAGNOSIS — Z98.891 HISTORY OF CESAREAN DELIVERY: ICD-10-CM

## 2020-07-01 DIAGNOSIS — N20.1 URETEROLITHIASIS: ICD-10-CM

## 2020-07-01 DIAGNOSIS — D72.829 LEUKOCYTOSIS, UNSPECIFIED TYPE: ICD-10-CM

## 2020-07-01 DIAGNOSIS — E87.20 METABOLIC ACIDOSIS: ICD-10-CM

## 2020-07-01 DIAGNOSIS — R10.9 ACUTE RIGHT FLANK PAIN: ICD-10-CM

## 2020-07-01 LAB
ALBUMIN SERPL BCP-MCNC: 5 G/DL (ref 3.2–4.9)
ALBUMIN/GLOB SERPL: 1.9 G/DL
ALP SERPL-CCNC: 90 U/L (ref 30–99)
ALT SERPL-CCNC: 32 U/L (ref 2–50)
ANION GAP SERPL CALC-SCNC: 19 MMOL/L (ref 7–16)
APPEARANCE UR: CLEAR
AST SERPL-CCNC: 25 U/L (ref 12–45)
BACTERIA #/AREA URNS HPF: ABNORMAL /HPF
BASOPHILS # BLD AUTO: 0.4 % (ref 0–1.8)
BASOPHILS # BLD: 0.06 K/UL (ref 0–0.12)
BILIRUB SERPL-MCNC: 0.3 MG/DL (ref 0.1–1.5)
BILIRUB UR QL STRIP.AUTO: NEGATIVE
BUN SERPL-MCNC: 18 MG/DL (ref 8–22)
CALCIUM SERPL-MCNC: 10.1 MG/DL (ref 8.5–10.5)
CHLORIDE SERPL-SCNC: 103 MMOL/L (ref 96–112)
CO2 SERPL-SCNC: 17 MMOL/L (ref 20–33)
COLOR UR: YELLOW
CREAT SERPL-MCNC: 0.78 MG/DL (ref 0.5–1.4)
EOSINOPHIL # BLD AUTO: 0.07 K/UL (ref 0–0.51)
EOSINOPHIL NFR BLD: 0.5 % (ref 0–6.9)
EPI CELLS #/AREA URNS HPF: ABNORMAL /HPF
ERYTHROCYTE [DISTWIDTH] IN BLOOD BY AUTOMATED COUNT: 41.3 FL (ref 35.9–50)
GLOBULIN SER CALC-MCNC: 2.7 G/DL (ref 1.9–3.5)
GLUCOSE SERPL-MCNC: 123 MG/DL (ref 65–99)
GLUCOSE UR STRIP.AUTO-MCNC: NEGATIVE MG/DL
HCG UR QL: NEGATIVE
HCT VFR BLD AUTO: 40.7 % (ref 37–47)
HGB BLD-MCNC: 13.8 G/DL (ref 12–16)
HYALINE CASTS #/AREA URNS LPF: ABNORMAL /LPF
IMM GRANULOCYTES # BLD AUTO: 0.07 K/UL (ref 0–0.11)
IMM GRANULOCYTES NFR BLD AUTO: 0.5 % (ref 0–0.9)
KETONES UR STRIP.AUTO-MCNC: ABNORMAL MG/DL
LEUKOCYTE ESTERASE UR QL STRIP.AUTO: ABNORMAL
LIPASE SERPL-CCNC: 20 U/L (ref 11–82)
LYMPHOCYTES # BLD AUTO: 2.46 K/UL (ref 1–4.8)
LYMPHOCYTES NFR BLD: 18.4 % (ref 22–41)
MCH RBC QN AUTO: 30.3 PG (ref 27–33)
MCHC RBC AUTO-ENTMCNC: 33.9 G/DL (ref 33.6–35)
MCV RBC AUTO: 89.3 FL (ref 81.4–97.8)
MICRO URNS: ABNORMAL
MONOCYTES # BLD AUTO: 0.71 K/UL (ref 0–0.85)
MONOCYTES NFR BLD AUTO: 5.3 % (ref 0–13.4)
MUCOUS THREADS #/AREA URNS HPF: ABNORMAL /HPF
NEUTROPHILS # BLD AUTO: 9.99 K/UL (ref 2–7.15)
NEUTROPHILS NFR BLD: 74.9 % (ref 44–72)
NITRITE UR QL STRIP.AUTO: NEGATIVE
NRBC # BLD AUTO: 0 K/UL
NRBC BLD-RTO: 0 /100 WBC
PH UR STRIP.AUTO: 5 [PH] (ref 5–8)
PLATELET # BLD AUTO: 290 K/UL (ref 164–446)
PMV BLD AUTO: 11.3 FL (ref 9–12.9)
POTASSIUM SERPL-SCNC: 3.5 MMOL/L (ref 3.6–5.5)
PROT SERPL-MCNC: 7.7 G/DL (ref 6–8.2)
PROT UR QL STRIP: NEGATIVE MG/DL
RBC # BLD AUTO: 4.56 M/UL (ref 4.2–5.4)
RBC # URNS HPF: ABNORMAL /HPF
RBC UR QL AUTO: NEGATIVE
SODIUM SERPL-SCNC: 139 MMOL/L (ref 135–145)
SP GR UR STRIP.AUTO: 1.03
UROBILINOGEN UR STRIP.AUTO-MCNC: 0.2 MG/DL
WBC # BLD AUTO: 13.4 K/UL (ref 4.8–10.8)
WBC #/AREA URNS HPF: ABNORMAL /HPF

## 2020-07-01 PROCEDURE — 85025 COMPLETE CBC W/AUTO DIFF WBC: CPT

## 2020-07-01 PROCEDURE — 700111 HCHG RX REV CODE 636 W/ 250 OVERRIDE (IP): Performed by: EMERGENCY MEDICINE

## 2020-07-01 PROCEDURE — 96376 TX/PRO/DX INJ SAME DRUG ADON: CPT

## 2020-07-01 PROCEDURE — 36415 COLL VENOUS BLD VENIPUNCTURE: CPT

## 2020-07-01 PROCEDURE — 96374 THER/PROPH/DIAG INJ IV PUSH: CPT

## 2020-07-01 PROCEDURE — 80053 COMPREHEN METABOLIC PANEL: CPT

## 2020-07-01 PROCEDURE — 81025 URINE PREGNANCY TEST: CPT

## 2020-07-01 PROCEDURE — 96375 TX/PRO/DX INJ NEW DRUG ADDON: CPT

## 2020-07-01 PROCEDURE — 99284 EMERGENCY DEPT VISIT MOD MDM: CPT

## 2020-07-01 PROCEDURE — 81001 URINALYSIS AUTO W/SCOPE: CPT

## 2020-07-01 PROCEDURE — 74176 CT ABD & PELVIS W/O CONTRAST: CPT

## 2020-07-01 PROCEDURE — 83690 ASSAY OF LIPASE: CPT

## 2020-07-01 RX ORDER — ONDANSETRON 2 MG/ML
4 INJECTION INTRAMUSCULAR; INTRAVENOUS ONCE
Status: COMPLETED | OUTPATIENT
Start: 2020-07-01 | End: 2020-07-01

## 2020-07-01 RX ORDER — KETOROLAC TROMETHAMINE 30 MG/ML
15 INJECTION, SOLUTION INTRAMUSCULAR; INTRAVENOUS ONCE
Status: COMPLETED | OUTPATIENT
Start: 2020-07-01 | End: 2020-07-01

## 2020-07-01 RX ORDER — HYDROMORPHONE HYDROCHLORIDE 1 MG/ML
1 INJECTION, SOLUTION INTRAMUSCULAR; INTRAVENOUS; SUBCUTANEOUS ONCE
Status: COMPLETED | OUTPATIENT
Start: 2020-07-01 | End: 2020-07-01

## 2020-07-01 RX ADMIN — HYDROMORPHONE HYDROCHLORIDE 1 MG: 1 INJECTION, SOLUTION INTRAMUSCULAR; INTRAVENOUS; SUBCUTANEOUS at 13:29

## 2020-07-01 RX ADMIN — ONDANSETRON 4 MG: 2 INJECTION INTRAMUSCULAR; INTRAVENOUS at 13:29

## 2020-07-01 RX ADMIN — KETOROLAC TROMETHAMINE 15 MG: 30 INJECTION, SOLUTION INTRAMUSCULAR at 14:35

## 2020-07-01 RX ADMIN — HYDROMORPHONE HYDROCHLORIDE 1 MG: 1 INJECTION, SOLUTION INTRAMUSCULAR; INTRAVENOUS; SUBCUTANEOUS at 14:12

## 2020-07-01 NOTE — ED PROVIDER NOTES
ED Provider Note    CHIEF COMPLAINT  Chief Complaint   Patient presents with   • Flank Pain       [facility]    Primary care provider: Pauline Mesa M.D.  Means of arrival: POV  History obtained from: Patient and   History limited by: Nothing    Yuri Sanchez is a 24 y.o. female who presents with severe right flank pain.  Onset 1 hour ago.  Feeling totally normal and well prior to onset of symptoms.  Symptoms began as severe sharp achy right flank pain occasionally radiates to her right lower quadrant.  No prior episodes.  No recent fevers or cough or dyspnea or known coronavirus contact.  No alleviating measures attempted.  No aggravating factors noted, the patient cannot sit still or find a position of comfort since symptoms began.  No falls or injuries or trauma.  No skin changes recently.  She did have a  in April and denies chance of pregnancy.  No chronic medical history she does not take any daily medications.  Other than the  she denies any past surgical history.  Unknown family history of kidney stones no personal history of kidney stones.  No vomiting but she does have nausea.  Pain is constant since onset and severe 10 out of 10.  No urinary symptoms or hematuria.    REVIEW OF SYSTEMS  Constitutional: Negative for fever or chills.   HENT: Negative for rhinorrhea or sore throat.    Respiratory: Negative for cough or shortness of breath.    Cardiovascular: Negative for chest pain or palpitations.   Gastrointestinal: Positive for nausea, negative for vomiting.  Genitourinary: Negative for dysuria but positive for right-sided flank pain.  Musculoskeletal: No extremity pain or swelling.  Skin: Negative for itching or rash.   Neurological: Negative for sensory or motor changes.    See HPI for further details. All other systems are negative.     PAST MEDICAL HISTORY   has a past medical history of Back pain, Bronchitis, Chest tightness, Chickenpox, Cough, Difficulty breathing, Gasping  "for breath, Painful breathing, Shortness of breath, Sputum production, Wears glasses, and Wheezing.    PAST FAMILY HISTORY  Family History   Problem Relation Age of Onset   • Alzheimer's Disease Mother    • Alzheimer's Disease Sister        SOCIAL HISTORY  Social History     Tobacco Use   • Smoking status: Never Smoker   • Smokeless tobacco: Never Used   Substance and Sexual Activity   • Alcohol use: No   • Drug use: No   • Sexual activity: Not on file       SURGICAL HISTORY   has a past surgical history that includes  delivery only (N/A, 4/10/2020).    CURRENT MEDICATIONS  No daily medications.    ALLERGIES  No Known Allergies    PHYSICAL EXAM  VITAL SIGNS: /68   Pulse 71   Temp 37.3 °C (99.1 °F) (Temporal)   Resp 18   Ht 1.499 m (4' 11\")   Wt 75.4 kg (166 lb 3.6 oz)   SpO2 99%   BMI 33.57 kg/m²    Pulse ox interpretation: On room air, I interpret this pulse ox as normal.  Constitutional: Well-developed, well-nourished.  Pacing around the room in severe distress.  HEENT: Normocephalic, atraumatic. Posterior pharynx clear, mucous membranes dry.  Eyes:  EOMI. Normal sclerae.  Neck: Supple, nontender.  Chest/Pulmonary: Clear to ausculation bilaterally, no wheezes or rhonchi.  Cardiovascular: Regular rate and rhythm, no murmur.   Abdomen: Soft, nontender; no rebound, guarding, or masses.  Back: Right-sided CVA tenderness, no midline tenderness or step-offs.    Musculoskeletal: No deformity or edema.  Neuro: Clear speech, normal coordination, cranial nerves II-XII grossly intact, no focal asymmetry or sensory deficits.   Psych: Distressed but pleasant and cooperative.  Skin: No rashes, warm and dry.      DIAGNOSTIC STUDIES / PROCEDURES    LABS & EKG  Results for orders placed or performed during the hospital encounter of 20   URINALYSIS,CULTURE IF INDICATED    Specimen: Urine, Clean Catch   Result Value Ref Range    Color Yellow     Character Clear     Specific Gravity 1.026 <1.035    Ph 5.0 " 5.0 - 8.0    Glucose Negative Negative mg/dL    Ketones Trace (A) Negative mg/dL    Protein Negative Negative mg/dL    Bilirubin Negative Negative    Urobilinogen, Urine 0.2 Negative    Nitrite Negative Negative    Leukocyte Esterase Trace (A) Negative    Occult Blood Negative Negative    Micro Urine Req Microscopic    BETA-HCG QUALITATIVE URINE   Result Value Ref Range    Beta-Hcg Urine Negative Negative   CBC WITH DIFFERENTIAL   Result Value Ref Range    WBC 13.4 (H) 4.8 - 10.8 K/uL    RBC 4.56 4.20 - 5.40 M/uL    Hemoglobin 13.8 12.0 - 16.0 g/dL    Hematocrit 40.7 37.0 - 47.0 %    MCV 89.3 81.4 - 97.8 fL    MCH 30.3 27.0 - 33.0 pg    MCHC 33.9 33.6 - 35.0 g/dL    RDW 41.3 35.9 - 50.0 fL    Platelet Count 290 164 - 446 K/uL    MPV 11.3 9.0 - 12.9 fL    Neutrophils-Polys 74.90 (H) 44.00 - 72.00 %    Lymphocytes 18.40 (L) 22.00 - 41.00 %    Monocytes 5.30 0.00 - 13.40 %    Eosinophils 0.50 0.00 - 6.90 %    Basophils 0.40 0.00 - 1.80 %    Immature Granulocytes 0.50 0.00 - 0.90 %    Nucleated RBC 0.00 /100 WBC    Neutrophils (Absolute) 9.99 (H) 2.00 - 7.15 K/uL    Lymphs (Absolute) 2.46 1.00 - 4.80 K/uL    Monos (Absolute) 0.71 0.00 - 0.85 K/uL    Eos (Absolute) 0.07 0.00 - 0.51 K/uL    Baso (Absolute) 0.06 0.00 - 0.12 K/uL    Immature Granulocytes (abs) 0.07 0.00 - 0.11 K/uL    NRBC (Absolute) 0.00 K/uL   CMP   Result Value Ref Range    Sodium 139 135 - 145 mmol/L    Potassium 3.5 (L) 3.6 - 5.5 mmol/L    Chloride 103 96 - 112 mmol/L    Co2 17 (L) 20 - 33 mmol/L    Anion Gap 19.0 (H) 7.0 - 16.0    Glucose 123 (H) 65 - 99 mg/dL    Bun 18 8 - 22 mg/dL    Creatinine 0.78 0.50 - 1.40 mg/dL    Calcium 10.1 8.5 - 10.5 mg/dL    AST(SGOT) 25 12 - 45 U/L    ALT(SGPT) 32 2 - 50 U/L    Alkaline Phosphatase 90 30 - 99 U/L    Total Bilirubin 0.3 0.1 - 1.5 mg/dL    Albumin 5.0 (H) 3.2 - 4.9 g/dL    Total Protein 7.7 6.0 - 8.2 g/dL    Globulin 2.7 1.9 - 3.5 g/dL    A-G Ratio 1.9 g/dL   LIPASE   Result Value Ref Range    Lipase 20 11  - 82 U/L   URINE MICROSCOPIC (W/UA)   Result Value Ref Range    WBC 5-10 (A) /hpf    RBC 0-2 /hpf    Bacteria Few (A) None /hpf    Epithelial Cells Few /hpf    Mucous Threads Moderate /hpf    Hyaline Cast 0-2 /lpf   ESTIMATED GFR   Result Value Ref Range    GFR If African American >60 >60 mL/min/1.73 m 2    GFR If Non African American >60 >60 mL/min/1.73 m 2       RADIOLOGY  CT-RENAL COLIC EVALUATION(A/P W/O)   Final Result         3 mm distal right ureteral stone with right-sided hydronephrosis and hydroureter.          COURSE & MEDICAL DECISION MAKING    This is a 24 y.o. female who presents with severe sudden onset right flank pain.    Differential Diagnosis includes but is not limited to:  Ureterolithiasis, hepatobiliary disease, zoster, appendicitis    ED Course:  This is a fairly healthy 24-year-old female with severe right flank pain onset 1 hour prior to arrival.  In significant distress and we will keep her n.p.o. until a surgical process is ruled out and treated with crystalloid fluid bolus, pain and nausea medicine, and evaluate with labs urinalysis and imaging.    Thankfully, medical work-up is very reassuring aside from evidence of a kidney stone.  There is a right millimeter distal right ureteral stone, this matches the patient's clinical presentation.  After several doses of parenteral pain medication she is now pain-free.  No strong markers of infection doubt infected stone.    On recheck patient and  are relieved with reassuring work-up today, discussed need for hydration and follow-up with urology if stones persist.  Offered a urine strainer but she declines.  Ibuprofen as needed for pain control return for any new or worsening symptoms especially fevers or vomiting or intractable pain.    Medications   HYDROmorphone pf (DILAUDID) injection 1 mg (1 mg Intravenous Given 7/1/20 1329)   ondansetron (ZOFRAN) syringe/vial injection 4 mg (4 mg Intravenous Given 7/1/20 1329)   HYDROmorphone pf  (DILAUDID) injection 1 mg (1 mg Intravenous Given 20 1412)   ketorolac (TORADOL) injection 15 mg (15 mg Intravenous Given 20 1435)     FINAL IMPRESSION  1. Acute right flank pain    2. Nausea    3. Ureterolithiasis    4. Dehydration    5. History of  delivery    6. Leukocytosis, unspecified type    7. Metabolic acidosis        PRESCRIPTIONS  Discharge Medication List as of 2020  3:55 PM          FOLLOW UP  Pauline Mesa M.D.  [address]  [address]  [facility] [id number] [phone]    Schedule an appointment as soon as possible for a visit in 2 days      [facility], Emergency Dept  [address] [address] [phone]  Today  If you have ANY new or worse symptoms!    Marcello Marcus M.D.  [address]  [facility] [address]  [phone]    Schedule an appointment as soon as possible for a visit in 1 week  As needed for follow-up with Urologist      -DISCHARGE-       Results, exam findings, clinical impression, presumed diagnosis, treatment options, and strict return precautions were discussed with the patient and , and they verbalized understanding, agreed with, and appreciated the plan of care.    Pertinent Labs & Imaging studies reviewed and verified by myself, as well as nursing notes and the patient's past medical, family, and social histories (See chart for details).    The patient is referred to a primary physician for blood pressure management, diabetic screening, and for all other preventative health concerns.     Portions of this record were made with voice recognition software.  Despite my review, spelling/grammar/context errors may still remain.  Interpretation of this chart should be taken in this context.

## 2020-07-01 NOTE — ED TRIAGE NOTES
Pt presents w extreme R flank pain starting approx 1 hr before presentation to ER; also c/o N/V; pt denies hx or family hx of kidney stones; pt denies dysuria, frequency, or urgency w urination

## 2020-07-01 NOTE — ED NOTES
Pt to triage, pt c/o rt flank pain, pt appears very uncomfortable, pacing, unable to hold still d/t pain. Pt to rm 56

## 2020-07-01 NOTE — DISCHARGE INSTRUCTIONS
You were seen and evaluated in the Emergency Department at [facility] for:     Severe right-sided flank pain    You had the following tests and studies:    Thankfully, your work-up today is very reassuring, you do have a kidney stone on the right side.  Hopefully the stone is already passed or will pass very soon.    You received the following medications:    Pain medication, IV fluids.    You received the following prescriptions:    Take ibuprofen up to 3 times per day as needed for pain and inflammation.  ----------------------------    Please make sure to follow up with:    Your primary care provider for recheck and routine health care, Dr. Marcus from urology if you get recurrent kidney stones, but if you have any worsening pain or fevers or vomiting or any other concerns return to the ER immediately.    Good luck, we hope you get better soon!  ----------------------------    We always encourage patients to return IMMEDIATELY if they have:  Increased or changing pain, passing out, fevers over 100.4 (taken in your mouth or rectally) for more than 2 days, redness or swelling of skin or tissues, feeling like your heart is beating fast, chest pain that is new or worsening, trouble breathing, feeling like your throat is closing up and can not breath, inability to walk, weakness of any part of your body, new dizziness, severe bleeding that won't stop from any part of your body, if you can't eat or drink, or if you have any other concerns.   If you feel worse, please know that you can always return with any questions, concerns, worse symptoms, or you are feeling unsafe. We certainly cannot say for sure that we have ruled out every illness or dangerous disease, but we feel that at this specific time, your exam, tests, and vital signs like heart rate and blood pressure are safe for discharge.

## 2020-07-02 ENCOUNTER — TELEPHONE (OUTPATIENT)
Dept: MEDICAL GROUP | Facility: MEDICAL CENTER | Age: 25
End: 2020-07-02

## 2020-07-02 NOTE — TELEPHONE ENCOUNTER
Phone Number Called: [phone] (home)       Call outcome: Spoke to patient regarding message below.    Message: Pt stated she went over things with Dr Castillo, and she is doing better today, she stated if she begins to decline she would give us a call back.     Please advise what next steps do you want to take?

## 2021-01-14 ENCOUNTER — APPOINTMENT (OUTPATIENT)
Dept: MEDICAL GROUP | Facility: MEDICAL CENTER | Age: 26
End: 2021-01-14
Payer: COMMERCIAL

## 2021-09-29 ENCOUNTER — HOSPITAL ENCOUNTER (OUTPATIENT)
Dept: LAB | Facility: MEDICAL CENTER | Age: 26
End: 2021-09-29
Attending: PHYSICIAN ASSISTANT
Payer: COMMERCIAL

## 2021-09-29 PROCEDURE — 88175 CYTOPATH C/V AUTO FLUID REDO: CPT

## 2021-09-30 LAB — CYTOLOGY REG CYTOL: NORMAL

## 2022-03-01 ENCOUNTER — TELEMEDICINE (OUTPATIENT)
Dept: MEDICAL GROUP | Facility: MEDICAL CENTER | Age: 27
End: 2022-03-01
Payer: COMMERCIAL

## 2022-03-01 VITALS — WEIGHT: 165 LBS | HEIGHT: 61 IN | TEMPERATURE: 98 F | BODY MASS INDEX: 31.15 KG/M2

## 2022-03-01 DIAGNOSIS — J30.81 ALLERGY TO CATS: ICD-10-CM

## 2022-03-01 DIAGNOSIS — J45.20 MILD INTERMITTENT ASTHMA WITHOUT COMPLICATION: ICD-10-CM

## 2022-03-01 PROCEDURE — 99213 OFFICE O/P EST LOW 20 MIN: CPT | Performed by: FAMILY MEDICINE

## 2022-03-01 RX ORDER — FEXOFENADINE HCL 180 MG/1
180 TABLET ORAL DAILY
Qty: 90 TABLET | Refills: 3 | Status: SHIPPED | OUTPATIENT
Start: 2022-03-01

## 2022-03-01 RX ORDER — MONTELUKAST SODIUM 10 MG/1
10 TABLET ORAL DAILY
Qty: 90 TABLET | Refills: 3 | Status: SHIPPED | OUTPATIENT
Start: 2022-03-01

## 2022-03-01 RX ORDER — ALBUTEROL SULFATE 90 UG/1
AEROSOL, METERED RESPIRATORY (INHALATION)
Qty: 8.5 G | Refills: 11 | Status: SHIPPED | OUTPATIENT
Start: 2022-03-01

## 2022-03-01 RX ORDER — EPINEPHRINE 0.3 MG/.3ML
0.3 INJECTION SUBCUTANEOUS ONCE
Qty: 1 EACH | Refills: 11 | Status: SHIPPED | OUTPATIENT
Start: 2022-03-01 | End: 2022-03-01

## 2022-03-01 RX ORDER — EPINEPHRINE 0.3 MG/.3ML
0.3 INJECTION SUBCUTANEOUS ONCE
Qty: 0.3 ML | Refills: 0 | Status: SHIPPED | OUTPATIENT
Start: 2022-03-01 | End: 2022-03-01

## 2022-03-01 ASSESSMENT — PATIENT HEALTH QUESTIONNAIRE - PHQ9: CLINICAL INTERPRETATION OF PHQ2 SCORE: 0

## 2022-03-01 ASSESSMENT — FIBROSIS 4 INDEX: FIB4 SCORE: 0.43

## 2022-03-01 NOTE — ASSESSMENT & PLAN NOTE
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    I suggest she carry epi pen given severity   I suggest she consult with allergy   Anti histamine and singulair called in   Consider controller inh   Renew rescue inh    20+ min spent

## 2022-03-01 NOTE — PROGRESS NOTES
Virtual Visit: Established Patient   This visit was conducted via Zoom using secure and encrypted videoconferencing technology.   The patient was in their home in the state of [place].    The patient's identity was confirmed and verbal consent was obtained for this virtual visit.     Subjective:   CC:   Chief Complaint   Patient presents with   • Referral Needed     Pt would like to see pulm, she was originally seen by them few years ago for inhaler. She has bad cat allergy and needs to be able to go into people houses with cats for her job. Wants to talk about how to manage with pulm.       Yuri Sanchez is a 26 y.o. female presenting for evaluation and management of:    See above         Current medicines (including changes today)  Current Outpatient Medications   Medication Sig Dispense Refill   • montelukast (SINGULAIR) 10 MG Tab Take 1 Tablet by mouth every day. 90 Tablet 3   • beclomethasone (QVAR) 80 MCG/ACT inhaler Inhale 1 Puff 2 times a day. 7.3 g 11   • EPINEPHrine (EPIPEN 2-DARCIE) 0.3 MG/0.3ML Solution Auto-injector solution for injection Inject 0.3 mL into the shoulder, thigh, or buttocks one time for 1 dose. 0.3 mL 0   • fexofenadine (ALLEGRA) 180 MG tablet Take 1 Tablet by mouth every day. 90 Tablet 3   • albuterol 108 (90 Base) MCG/ACT Aero Soln inhalation aerosol Shake well.  Take 2 puffs every 4 hours as needed for cough, chest tightness, or wheezing. 8.5 g 11   • albuterol 108 (90 Base) MCG/ACT Aero Soln inhalation aerosol Inhale 2 Puffs by mouth every 6 hours as needed for Shortness of Breath. 8.5 g 1   • ibuprofen (MOTRIN) 200 MG Tab Take 1-3 Tabs by mouth every 6 hours as needed for Mild Pain. 30 Tab    • acetaminophen (TYLENOL) 325 MG Tab Take 1-2 Tabs by mouth every 6 hours as needed for Mild Pain or Moderate Pain.     • albuterol 108 (90 Base) MCG/ACT Aero Soln inhalation aerosol by Other route.     • ALBUTEROL INH 2 Puffs by Nebulization route.     • Spacer/Aero-Holding Chambers  "(AEROCHAMBER PLUS CINDY-VU) Misc Use as directed with metered dose inhaler.     • Spacer/Aero-Holding Chambers (AEROCHAMBER PLUS CINDY-VU) Misc by Other route.       No current facility-administered medications for this visit.       Patient Active Problem List    Diagnosis Date Noted   • Allergy to cats 03/01/2022   • Labor and delivery indication for care or intervention 04/12/2020   • Arrest of dilation, delivered, current hospitalization 04/12/2020   • Postoperative pain 04/12/2020   • Less than 8 weeks gestation of pregnancy 07/29/2019   • Flexural eczema 07/29/2019   • Mild intermittent asthma without complication 11/07/2018   • BMI 36.0-36.9,adult 11/07/2018        Objective:   Temp 36.7 °C (98 °F) (Temporal) Comment: pt stated  Ht 1.549 m (5' 1\") Comment: pt stated  Wt 74.8 kg (165 lb) Comment: pt stated  BMI 31.18 kg/m²     Physical Exam:  Constitutional: Alert, no distress, well-groomed.  Skin: No rashes in visible areas.  Eye: Round. Conjunctiva clear, lids normal. No icterus.   ENMT: Lips pink without lesions, good dentition, moist mucous membranes. Phonation normal.  Neck: No masses, no thyromegaly. Moves freely without pain.  Respiratory: Unlabored respiratory effort, no cough or audible wheeze  Psych: Alert and oriented x3, normal affect and mood.     Assessment and Plan:   The following treatment plan was discussed:     1. Allergy to cats  - Referral to Allergy  - montelukast (SINGULAIR) 10 MG Tab; Take 1 Tablet by mouth every day.  Dispense: 90 Tablet; Refill: 3  - EPINEPHrine (EPIPEN 2-DARCIE) 0.3 MG/0.3ML Solution Auto-injector solution for injection; Inject 0.3 mL into the shoulder, thigh, or buttocks one time for 1 dose.  Dispense: 0.3 mL; Refill: 0  - Referral to Allergy  - fexofenadine (ALLEGRA) 180 MG tablet; Take 1 Tablet by mouth every day.  Dispense: 90 Tablet; Refill: 3    2. Mild intermittent asthma without complication  - montelukast (SINGULAIR) 10 MG Tab; Take 1 Tablet by mouth every day.  " Dispense: 90 Tablet; Refill: 3  - beclomethasone (QVAR) 80 MCG/ACT inhaler; Inhale 1 Puff 2 times a day.  Dispense: 7.3 g; Refill: 11  - albuterol 108 (90 Base) MCG/ACT Aero Soln inhalation aerosol; Shake well.  Take 2 puffs every 4 hours as needed for cough, chest tightness, or wheezing.  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    I suggest she carry epi pen given severity   I suggest she consult with allergy   Anti histamine and singulair called in   Consider controller inh   Renew rescue inh    20+ min spent            Relevant Medications    montelukast (SINGULAIR) 10 MG Tab    EPINEPHrine (EPIPEN 2-DARCIE) 0.3 MG/0.3ML Solution Auto-injector solution for injection    fexofenadine (ALLEGRA) 180 MG tablet    Other Relevant Orders    Referral to Allergy    Referral to Allergy          Follow-up: No follow-ups on file.

## 2023-08-28 NOTE — TELEPHONE ENCOUNTER
MEDICATION PRIOR AUTHORIZATION NEEDED:    1. Name of Medication: Symbicort 80-4.5 mcg    2. Requested By (Name of Pharmacy): [facility]     3. Is insurance on file current? yes    4. What is the name & phone number of the 3rd party payor? [facility]  [phone]   malaise, weakness/FEVER

## 2023-12-21 ENCOUNTER — TELEMEDICINE (OUTPATIENT)
Dept: MEDICAL GROUP | Facility: PHYSICIAN GROUP | Age: 28
End: 2023-12-21
Payer: COMMERCIAL

## 2023-12-21 VITALS — HEART RATE: 78 BPM | WEIGHT: 169 LBS | HEIGHT: 59 IN | TEMPERATURE: 96.8 F | BODY MASS INDEX: 34.07 KG/M2

## 2023-12-21 DIAGNOSIS — J45.20 MILD INTERMITTENT ASTHMA WITHOUT COMPLICATION: ICD-10-CM

## 2023-12-21 DIAGNOSIS — Z30.015 ENCOUNTER FOR INITIAL PRESCRIPTION OF VAGINAL RING HORMONAL CONTRACEPTIVE: ICD-10-CM

## 2023-12-21 PROBLEM — Z30.09 CONTRACEPTIVE USE EDUCATION: Status: ACTIVE | Noted: 2023-12-21

## 2023-12-21 PROCEDURE — 99213 OFFICE O/P EST LOW 20 MIN: CPT | Performed by: STUDENT IN AN ORGANIZED HEALTH CARE EDUCATION/TRAINING PROGRAM

## 2023-12-21 RX ORDER — BECLOMETHASONE DIPROPIONATE HFA 80 UG/1
1 AEROSOL, METERED RESPIRATORY (INHALATION) 2 TIMES DAILY
Qty: 10.6 G | Refills: 5 | Status: SHIPPED | OUTPATIENT
Start: 2023-12-21

## 2023-12-21 ASSESSMENT — PATIENT HEALTH QUESTIONNAIRE - PHQ9: CLINICAL INTERPRETATION OF PHQ2 SCORE: 0

## 2023-12-21 NOTE — PROGRESS NOTES
Virtual Visit: Established Patient   This visit was conducted via Zoom using secure and encrypted videoconferencing technology.   The patient was in their home in the [facility].    The patient's identity was confirmed and verbal consent was obtained for this virtual visit.    PCP: Pauline Mesa MD    Subjective:   CC:   Chief Complaint   Patient presents with    Medication Refill     beclomethasone (QVAR) 80 MCG/ACT inhaler     Yuri Sanchez is a 28 y.o. female presenting for evaluation and management of:    Problem   Encounter for Initial Prescription of Vaginal Ring Hormonal Contraceptive    The patient is requesting a prescription for the NuvaRing.  She has used it in the past and it is her preferred form of birth control.  She has not used the NuvaRing for several years and is not currently using anything for contraception.  LMP 12/14/ (approximate).     Mild Intermittent Asthma Without Complication    Chronic condition.  Currently stable.  Current medications:  Qvar twice daily  Albuterol as needed  Montelukast 10 mg daily         ROS   Gen: No fevers, chills.  ENT: No congestion.  Pulm: No cough or shortness of breath.  CV: No chest pain or palpitations.      Current medicines (including changes today)  Current Outpatient Medications   Medication Sig Dispense Refill    beclomethasone HFA (QVAR REDIHALER) 80 MCG/ACT inhaler Inhale 1 Puff 2 times a day. 10.6 g 5    montelukast (SINGULAIR) 10 MG Tab Take 1 Tablet by mouth every day. 90 Tablet 3    fexofenadine (ALLEGRA) 180 MG tablet Take 1 Tablet by mouth every day. 90 Tablet 3    albuterol 108 (90 Base) MCG/ACT Aero Soln inhalation aerosol Shake well.  Take 2 puffs every 4 hours as needed for cough, chest tightness, or wheezing. 8.5 g 11    albuterol 108 (90 Base) MCG/ACT Aero Soln inhalation aerosol Inhale 2 Puffs by mouth every 6 hours as needed for Shortness of Breath. 8.5 g 1    ibuprofen (MOTRIN) 200 MG Tab Take 1-3 Tabs by mouth every 6 hours  "as needed for Mild Pain. 30 Tab     acetaminophen (TYLENOL) 325 MG Tab Take 1-2 Tabs by mouth every 6 hours as needed for Mild Pain or Moderate Pain.      ALBUTEROL INH 2 Puffs by Nebulization route.      Spacer/Aero-Holding Chambers (AEROCHAMBER PLUS CINDY-VU) Misc Use as directed with metered dose inhaler.      Spacer/Aero-Holding Chambers (AEROCHAMBER PLUS CINDY-VU) Misc by Other route.      albuterol 108 (90 Base) MCG/ACT Aero Soln inhalation aerosol by Other route.       No current facility-administered medications for this visit.       Patient Active Problem List    Diagnosis Date Noted    Encounter for initial prescription of vaginal ring hormonal contraceptive 12/21/2023    Allergy to cats 03/01/2022    Labor and delivery indication for care or intervention 04/12/2020    Arrest of dilation, delivered, current hospitalization 04/12/2020    Postoperative pain 04/12/2020    Less than 8 weeks gestation of pregnancy 07/29/2019    Flexural eczema 07/29/2019    Mild intermittent asthma without complication 11/07/2018    BMI 36.0-36.9,adult 11/07/2018        Objective:   Pulse 78 Comment: reports  Temp 36 °C (96.8 °F) Comment: PT reports  Ht 1.499 m (4' 11\") Comment: PT reports  Wt 76.7 kg (169 lb) Comment: PT reports  BMI 34.13 kg/m²     Physical Exam:  Constitutional: Alert, no distress, well-groomed.  Skin: No rashes in visible areas.  Eye: Round. Conjunctiva clear, lids normal. No icterus.   ENMT: Lips pink without lesions, good dentition, moist mucous membranes. Phonation normal.  Neck: No masses, no thyromegaly. Moves freely without pain.  Respiratory: Unlabored respiratory effort, no cough or audible wheeze  Psych: Alert and oriented x3, normal affect and mood.     Assessment and Plan:   The following treatment plan was discussed:     1. Mild intermittent asthma without complication  Chronic, controlled.  Requesting a refill of her Qvar inhaler.  Refill provided.  Continue current medication regimen.  - " beclomethasone HFA (QVAR REDIHALER) 80 MCG/ACT inhaler; Inhale 1 Puff 2 times a day.  Dispense: 10.6 g; Refill: 5    2. Encounter for initial prescription of vaginal ring hormonal contraceptive  Patient would like a prescription for the NuvaRing.  She has used it in the past and would like to restart.  She is not currently using anything for contraception.  LMP 12/14/2023, periods are regular.  Did explain to the patient that we need to her to complete a pregnancy test prior to initiating hormonal contraception.  She will come in in the next few days for an MA visit to provide a urine sample.  After a negative pregnancy test result, I will provide her a prescription for the NuvaRing, and she will follow-up with her primary care provider for future refills.      Follow-up: Return in about 6 months (around 6/21/2024) for refills.

## 2023-12-22 ENCOUNTER — TELEPHONE (OUTPATIENT)
Dept: MEDICAL GROUP | Facility: PHYSICIAN GROUP | Age: 28
End: 2023-12-22
Payer: COMMERCIAL

## 2023-12-22 NOTE — TELEPHONE ENCOUNTER
DOCUMENTATION OF PAR STATUS:    1. Name of Medication & Dose: Qvar redihaler 80mcg     2. Name of Prescription Coverage Company & phone #: [employer]     3. Date Prior Auth Submitted: 12/22/2023    4. What information was given to obtain insurance decision? diagnosis code j45.20     5. Prior Auth Status? Pending    6. Patient Notified: N\A

## 2023-12-22 NOTE — TELEPHONE ENCOUNTER
FINAL PRIOR AUTHORIZATION STATUS:    1.  Name of Medication & Dose: Qvar      2. Prior Auth Status: Approved through [employer]     3. Action Taken: Pharmacy Notified: N\A Patient Notified: N\A    Ref Number [id number].

## 2024-05-30 ENCOUNTER — HOSPITAL ENCOUNTER (OUTPATIENT)
Facility: MEDICAL CENTER | Age: 29
End: 2024-05-30
Attending: OBSTETRICS & GYNECOLOGY
Payer: COMMERCIAL

## 2024-05-31 ENCOUNTER — HOSPITAL ENCOUNTER (OUTPATIENT)
Facility: MEDICAL CENTER | Age: 29
End: 2024-05-31
Attending: OBSTETRICS & GYNECOLOGY
Payer: COMMERCIAL

## 2024-05-31 LAB
ABO GROUP BLD: NORMAL
BASOPHILS # BLD AUTO: 0.4 % (ref 0–1.8)
BASOPHILS # BLD: 0.04 K/UL (ref 0–0.12)
BLD GP AB SCN SERPL QL: NORMAL
EOSINOPHIL # BLD AUTO: 0.16 K/UL (ref 0–0.51)
EOSINOPHIL NFR BLD: 1.7 % (ref 0–6.9)
ERYTHROCYTE [DISTWIDTH] IN BLOOD BY AUTOMATED COUNT: 41.3 FL (ref 35.9–50)
HBV SURFACE AG SER QL: ABNORMAL
HCT VFR BLD AUTO: 38.8 % (ref 37–47)
HCV AB SER QL: ABNORMAL
HGB BLD-MCNC: 13.6 G/DL (ref 12–16)
HIV 1+2 AB+HIV1 P24 AG SERPL QL IA: NORMAL
IMM GRANULOCYTES # BLD AUTO: 0.04 K/UL (ref 0–0.11)
IMM GRANULOCYTES NFR BLD AUTO: 0.4 % (ref 0–0.9)
LYMPHOCYTES # BLD AUTO: 1.81 K/UL (ref 1–4.8)
LYMPHOCYTES NFR BLD: 18.9 % (ref 22–41)
MCH RBC QN AUTO: 32.8 PG (ref 27–33)
MCHC RBC AUTO-ENTMCNC: 35.1 G/DL (ref 32.2–35.5)
MCV RBC AUTO: 93.5 FL (ref 81.4–97.8)
MONOCYTES # BLD AUTO: 0.4 K/UL (ref 0–0.85)
MONOCYTES NFR BLD AUTO: 4.2 % (ref 0–13.4)
NEUTROPHILS # BLD AUTO: 7.14 K/UL (ref 1.82–7.42)
NEUTROPHILS NFR BLD: 74.4 % (ref 44–72)
NRBC # BLD AUTO: 0 K/UL
NRBC BLD-RTO: 0 /100 WBC (ref 0–0.2)
PLATELET # BLD AUTO: 220 K/UL (ref 164–446)
PMV BLD AUTO: 11.8 FL (ref 9–12.9)
RBC # BLD AUTO: 4.15 M/UL (ref 4.2–5.4)
RH BLD: NORMAL
RUBV AB SER QL: 482 IU/ML
T PALLIDUM AB SER QL IA: ABNORMAL
WBC # BLD AUTO: 9.6 K/UL (ref 4.8–10.8)

## 2024-05-31 PROCEDURE — 86762 RUBELLA ANTIBODY: CPT

## 2024-05-31 PROCEDURE — 86803 HEPATITIS C AB TEST: CPT

## 2024-05-31 PROCEDURE — 87086 URINE CULTURE/COLONY COUNT: CPT

## 2024-05-31 PROCEDURE — 86780 TREPONEMA PALLIDUM: CPT

## 2024-05-31 PROCEDURE — 87340 HEPATITIS B SURFACE AG IA: CPT

## 2024-05-31 PROCEDURE — 87389 HIV-1 AG W/HIV-1&-2 AB AG IA: CPT

## 2024-05-31 PROCEDURE — 86900 BLOOD TYPING SEROLOGIC ABO: CPT

## 2024-05-31 PROCEDURE — 36415 COLL VENOUS BLD VENIPUNCTURE: CPT

## 2024-05-31 PROCEDURE — 85025 COMPLETE CBC W/AUTO DIFF WBC: CPT

## 2024-05-31 PROCEDURE — 86850 RBC ANTIBODY SCREEN: CPT

## 2024-05-31 PROCEDURE — 87077 CULTURE AEROBIC IDENTIFY: CPT

## 2024-05-31 PROCEDURE — 86901 BLOOD TYPING SEROLOGIC RH(D): CPT

## 2024-05-31 PROCEDURE — 86592 SYPHILIS TEST NON-TREP QUAL: CPT

## 2024-06-02 LAB
BACTERIA UR CULT: ABNORMAL
BACTERIA UR CULT: ABNORMAL
SIGNIFICANT IND 70042: ABNORMAL
SITE SITE: ABNORMAL
SOURCE SOURCE: ABNORMAL

## 2024-06-03 LAB
C TRACH RRNA CVX QL NAA+PROBE: NEGATIVE
COMMENT NL11729A: NORMAL
CYTOLOGIST CVX/VAG CYTO: NORMAL
CYTOLOGY CVX/VAG DOC CYTO: NORMAL
CYTOLOGY CVX/VAG DOC THIN PREP: NORMAL
N GONORRHOEA RRNA CVX QL NAA+PROBE: NEGATIVE
NOTE NL11727A: NORMAL
OTHER STN SPEC: NORMAL
STAT OF ADQ CVX/VAG CYTO-IMP: NORMAL

## 2024-09-20 ENCOUNTER — HOSPITAL ENCOUNTER (OUTPATIENT)
Facility: MEDICAL CENTER | Age: 29
End: 2024-09-20
Attending: OBSTETRICS & GYNECOLOGY
Payer: COMMERCIAL

## 2024-09-20 LAB
BLD GP AB SCN SERPL QL: NORMAL
GLUCOSE 1H P 50 G GLC PO SERPL-MCNC: 113 MG/DL (ref 70–139)
HCT VFR BLD AUTO: 36.8 % (ref 37–47)
HGB BLD-MCNC: 12.3 G/DL (ref 12–16)
PLATELET # BLD AUTO: 185 K/UL (ref 164–446)
T PALLIDUM AB SER QL IA: NONREACTIVE

## 2024-09-20 PROCEDURE — 86780 TREPONEMA PALLIDUM: CPT

## 2024-09-20 PROCEDURE — 86850 RBC ANTIBODY SCREEN: CPT

## 2024-09-20 PROCEDURE — 85018 HEMOGLOBIN: CPT

## 2024-09-20 PROCEDURE — 82950 GLUCOSE TEST: CPT

## 2024-09-20 PROCEDURE — 36415 COLL VENOUS BLD VENIPUNCTURE: CPT

## 2024-09-20 PROCEDURE — 85014 HEMATOCRIT: CPT

## 2024-09-20 PROCEDURE — 85049 AUTOMATED PLATELET COUNT: CPT

## 2024-10-03 ENCOUNTER — HOSPITAL ENCOUNTER (OUTPATIENT)
Facility: MEDICAL CENTER | Age: 29
End: 2024-10-03
Attending: OBSTETRICS & GYNECOLOGY | Admitting: OBSTETRICS & GYNECOLOGY
Payer: COMMERCIAL

## 2024-10-03 VITALS
DIASTOLIC BLOOD PRESSURE: 57 MMHG | HEART RATE: 82 BPM | TEMPERATURE: 97.1 F | BODY MASS INDEX: 36.96 KG/M2 | WEIGHT: 183 LBS | SYSTOLIC BLOOD PRESSURE: 106 MMHG | RESPIRATION RATE: 17 BRPM

## 2024-10-03 PROCEDURE — 96372 THER/PROPH/DIAG INJ SC/IM: CPT

## 2024-10-03 PROCEDURE — 700112 HCHG RX REV CODE 229: Performed by: OBSTETRICS & GYNECOLOGY

## 2024-10-03 PROCEDURE — 59025 FETAL NON-STRESS TEST: CPT

## 2024-10-03 RX ADMIN — HUMAN RHO(D) IMMUNE GLOBULIN 300 MCG: 1500 SOLUTION INTRAMUSCULAR; INTRAVENOUS at 16:50

## 2024-10-03 ASSESSMENT — PAIN SCALES - GENERAL: PAINLEVEL: 0 - NO PAIN

## 2024-11-19 ENCOUNTER — APPOINTMENT (OUTPATIENT)
Dept: ADMISSIONS | Facility: MEDICAL CENTER | Age: 29
End: 2024-11-19
Attending: OBSTETRICS & GYNECOLOGY
Payer: COMMERCIAL

## 2024-11-20 ENCOUNTER — HOSPITAL ENCOUNTER (OUTPATIENT)
Facility: MEDICAL CENTER | Age: 29
End: 2024-11-20
Attending: OBSTETRICS & GYNECOLOGY
Payer: COMMERCIAL

## 2024-11-20 PROCEDURE — 87081 CULTURE SCREEN ONLY: CPT

## 2024-11-20 PROCEDURE — 87150 DNA/RNA AMPLIFIED PROBE: CPT

## 2024-11-22 LAB — GP B STREP DNA SPEC QL NAA+PROBE: NEGATIVE

## 2024-11-25 ENCOUNTER — PRE-ADMISSION TESTING (OUTPATIENT)
Dept: ADMISSIONS | Facility: MEDICAL CENTER | Age: 29
End: 2024-11-25
Attending: OBSTETRICS & GYNECOLOGY
Payer: COMMERCIAL

## 2024-11-25 NOTE — OR NURSING
MD Dumas's surgery scheduler Beverly stated MD Dumsa is aware of patient's request to be on the bloodless program. Patient's  is scheduled on 2024 with MD Dumas.

## 2024-12-11 NOTE — H&P
CHIEF COMPLAINT:  1.  A 39 and 4/7 weeks gestation.  2.  Previous  section for dystocia.  3.  Rh negative, non-sensitized.     HISTORY OF PRESENT ILLNESS:  The patient is a 29-year-old lady,  2,   para 1.  Her MARCO ANTONIO is 2024, so her EGA is 39 and 4/7 weeks at the time of   the scheduled repeat  section.  She declines sterilization.  She has a   previous  section for dystocia.  Risks and benefits were discussed.     The patient is a devout Gnosticist and would decline any at all   cellular blood products as well as fresh frozen plasma.  Her advanced   directive is signed and in the chart.     PRENATAL CARE:  Blood type is O negative.  She did receive antepartum RhoGAM.    She declined all optional genetic screening tests as well as maternal serum   alpha-fetoprotein testing.  Ultrasound at 20 weeks revealed anatomically   normal female fetus with right/anterior fundal placenta, remote from the    scar, and a normal clear zone.  Gestational diabetes screen was   normal.  All of her blood pressures were normal.  Her total pregnancy weight   gain was 15 pounds.  She did receive Tdap.     OBSTETRIC HISTORY:  1.  Primary low transverse  section at 41 and 2/7 weeks 2020, 8   pound baby girl.  She had arrest of dilatation at 4 cm and suspected   chorioamnionitis.  2.  Present pregnancy.     PAST MEDICAL HISTORY:  Positive for asthma.     ALLERGIES:  No known drug allergies.     MEDICATIONS:   1.  Prenatal vitamin daily.  2.  QVAR RediHaler 1 actuation daily.     PAST SURGICAL HISTORY:  Primary  section in 2020.     FAMILY HISTORY:  Positive for diabetes in every grandparent.  Chronic   hypertension in her maternal grandmother.  Colon cancer in her maternal   grandfather.     SOCIAL HISTORY:  She is employed with [employer] in the [email or web address]   department.  She is  to Juan Pablo, a window covering .  She   denies alcohol, tobacco  or drug use.     PHYSICAL EXAMINATION:  VITAL SIGNS:  Afebrile, /60, weight 190 pounds.  HEENT:  Normal.  LUNGS:  Clear to auscultation.  HEART:  Sounds normal.  ABDOMEN:  Nontender.  Fundal height is appropriate.  No hepatosplenomegaly. Prominent Pfannenstiel keloid scar.  EXTREMITIES:  No edema.  Homans' negative.  NEUROLOGIC:  DTRs normal.  PELVIC:  Not indicated.     DIAGNOSES:  1.  A 39 and 4/7 week gestation.  2.  Previous  section for dystocia.  3.  Rh negative, non-sensitized.  4.  The patient is a devout [employer]ybeterian and would refuse transfusion of   all blood products as well as fresh frozen plasma.     PLAN:    Repeat low transverse  section.    She does have a prominent keloid scar from her first  where her skin was closed with 4-0 Monocryl subcuticular suture, so at this time, I will try Insorb resorbable subcutaneous staples for the skin closure.        ______________________________  MD CORKY Keyes/NAE    DD:  2024 12:46  DT:  2024 13:21    Job#:  [id number]

## 2024-12-16 ENCOUNTER — ANESTHESIA EVENT (OUTPATIENT)
Dept: OBGYN | Facility: MEDICAL CENTER | Age: 29
End: 2024-12-16
Payer: COMMERCIAL

## 2024-12-17 ENCOUNTER — ANESTHESIA (OUTPATIENT)
Dept: OBGYN | Facility: MEDICAL CENTER | Age: 29
End: 2024-12-17
Payer: COMMERCIAL

## 2024-12-17 ENCOUNTER — HOSPITAL ENCOUNTER (INPATIENT)
Facility: MEDICAL CENTER | Age: 29
LOS: 3 days | End: 2024-12-20
Attending: OBSTETRICS & GYNECOLOGY | Admitting: OBSTETRICS & GYNECOLOGY
Payer: COMMERCIAL

## 2024-12-17 DIAGNOSIS — G89.18 POSTOPERATIVE PAIN: ICD-10-CM

## 2024-12-17 LAB
BASOPHILS # BLD AUTO: 0.4 % (ref 0–1.8)
BASOPHILS # BLD: 0.05 K/UL (ref 0–0.12)
EOSINOPHIL # BLD AUTO: 0.2 K/UL (ref 0–0.51)
EOSINOPHIL NFR BLD: 1.6 % (ref 0–6.9)
ERYTHROCYTE [DISTWIDTH] IN BLOOD BY AUTOMATED COUNT: 45.4 FL (ref 35.9–50)
ERYTHROCYTE [DISTWIDTH] IN BLOOD BY AUTOMATED COUNT: 45.9 FL (ref 35.9–50)
HCT VFR BLD AUTO: 31.9 % (ref 37–47)
HCT VFR BLD AUTO: 36.6 % (ref 37–47)
HGB BLD-MCNC: 10.9 G/DL (ref 12–16)
HGB BLD-MCNC: 12.3 G/DL (ref 12–16)
HOLDING TUBE BB 8507: NORMAL
IMM GRANULOCYTES # BLD AUTO: 0.09 K/UL (ref 0–0.11)
IMM GRANULOCYTES NFR BLD AUTO: 0.7 % (ref 0–0.9)
IMMUNE ROSETTING TEST 8505FMH: NORMAL
LYMPHOCYTES # BLD AUTO: 2.13 K/UL (ref 1–4.8)
LYMPHOCYTES NFR BLD: 17.5 % (ref 22–41)
MCH RBC QN AUTO: 31.9 PG (ref 27–33)
MCH RBC QN AUTO: 32.2 PG (ref 27–33)
MCHC RBC AUTO-ENTMCNC: 33.6 G/DL (ref 32.2–35.5)
MCHC RBC AUTO-ENTMCNC: 34.2 G/DL (ref 32.2–35.5)
MCV RBC AUTO: 94.4 FL (ref 81.4–97.8)
MCV RBC AUTO: 94.8 FL (ref 81.4–97.8)
MONOCYTES # BLD AUTO: 0.67 K/UL (ref 0–0.85)
MONOCYTES NFR BLD AUTO: 5.5 % (ref 0–13.4)
NEUTROPHILS # BLD AUTO: 9.02 K/UL (ref 1.82–7.42)
NEUTROPHILS NFR BLD: 74.3 % (ref 44–72)
NRBC # BLD AUTO: 0 K/UL
NRBC BLD-RTO: 0 /100 WBC (ref 0–0.2)
NUMBER OF RH DOSES IND 8505RD: 1
PLATELET # BLD AUTO: 163 K/UL (ref 164–446)
PLATELET # BLD AUTO: 164 K/UL (ref 164–446)
PMV BLD AUTO: 12.3 FL (ref 9–12.9)
PMV BLD AUTO: 12.3 FL (ref 9–12.9)
RBC # BLD AUTO: 3.38 M/UL (ref 4.2–5.4)
RBC # BLD AUTO: 3.86 M/UL (ref 4.2–5.4)
T PALLIDUM AB SER QL IA: NORMAL
WBC # BLD AUTO: 12.2 K/UL (ref 4.8–10.8)
WBC # BLD AUTO: 14.6 K/UL (ref 4.8–10.8)

## 2024-12-17 PROCEDURE — A9270 NON-COVERED ITEM OR SERVICE: HCPCS | Performed by: STUDENT IN AN ORGANIZED HEALTH CARE EDUCATION/TRAINING PROGRAM

## 2024-12-17 PROCEDURE — 85027 COMPLETE CBC AUTOMATED: CPT

## 2024-12-17 PROCEDURE — 700111 HCHG RX REV CODE 636 W/ 250 OVERRIDE (IP): Mod: JZ | Performed by: STUDENT IN AN ORGANIZED HEALTH CARE EDUCATION/TRAINING PROGRAM

## 2024-12-17 PROCEDURE — 160002 HCHG RECOVERY MINUTES (STAT): Performed by: OBSTETRICS & GYNECOLOGY

## 2024-12-17 PROCEDURE — 85025 COMPLETE CBC W/AUTO DIFF WBC: CPT

## 2024-12-17 PROCEDURE — 160048 HCHG OR STATISTICAL LEVEL 1-5: Performed by: OBSTETRICS & GYNECOLOGY

## 2024-12-17 PROCEDURE — 160009 HCHG ANES TIME/MIN: Performed by: OBSTETRICS & GYNECOLOGY

## 2024-12-17 PROCEDURE — 700105 HCHG RX REV CODE 258: Performed by: STUDENT IN AN ORGANIZED HEALTH CARE EDUCATION/TRAINING PROGRAM

## 2024-12-17 PROCEDURE — 85461 HEMOGLOBIN FETAL: CPT

## 2024-12-17 PROCEDURE — 36415 COLL VENOUS BLD VENIPUNCTURE: CPT

## 2024-12-17 PROCEDURE — 770002 HCHG ROOM/CARE - OB PRIVATE (112)

## 2024-12-17 PROCEDURE — 700102 HCHG RX REV CODE 250 W/ 637 OVERRIDE(OP): Performed by: STUDENT IN AN ORGANIZED HEALTH CARE EDUCATION/TRAINING PROGRAM

## 2024-12-17 PROCEDURE — 86780 TREPONEMA PALLIDUM: CPT

## 2024-12-17 PROCEDURE — 700111 HCHG RX REV CODE 636 W/ 250 OVERRIDE (IP): Performed by: OBSTETRICS & GYNECOLOGY

## 2024-12-17 PROCEDURE — 160041 HCHG SURGERY MINUTES - EA ADDL 1 MIN LEVEL 4: Performed by: OBSTETRICS & GYNECOLOGY

## 2024-12-17 PROCEDURE — 160035 HCHG PACU - 1ST 60 MINS PHASE I: Performed by: OBSTETRICS & GYNECOLOGY

## 2024-12-17 PROCEDURE — 700101 HCHG RX REV CODE 250: Performed by: STUDENT IN AN ORGANIZED HEALTH CARE EDUCATION/TRAINING PROGRAM

## 2024-12-17 PROCEDURE — 160029 HCHG SURGERY MINUTES - 1ST 30 MINS LEVEL 4: Performed by: OBSTETRICS & GYNECOLOGY

## 2024-12-17 PROCEDURE — 700105 HCHG RX REV CODE 258: Performed by: OBSTETRICS & GYNECOLOGY

## 2024-12-17 PROCEDURE — C1755 CATHETER, INTRASPINAL: HCPCS | Performed by: OBSTETRICS & GYNECOLOGY

## 2024-12-17 RX ORDER — DIPHENHYDRAMINE HYDROCHLORIDE 50 MG/ML
12.5 INJECTION INTRAMUSCULAR; INTRAVENOUS EVERY 6 HOURS PRN
Status: ACTIVE | OUTPATIENT
Start: 2024-12-17 | End: 2024-12-18

## 2024-12-17 RX ORDER — OXYTOCIN 10 [USP'U]/ML
10 INJECTION, SOLUTION INTRAMUSCULAR; INTRAVENOUS
Status: DISCONTINUED | OUTPATIENT
Start: 2024-12-17 | End: 2024-12-20 | Stop reason: HOSPADM

## 2024-12-17 RX ORDER — SODIUM CHLORIDE, SODIUM GLUCONATE, SODIUM ACETATE, POTASSIUM CHLORIDE AND MAGNESIUM CHLORIDE 526; 502; 368; 37; 30 MG/100ML; MG/100ML; MG/100ML; MG/100ML; MG/100ML
INJECTION, SOLUTION INTRAVENOUS
Status: DISCONTINUED | OUTPATIENT
Start: 2024-12-17 | End: 2024-12-17 | Stop reason: SURG

## 2024-12-17 RX ORDER — ONDANSETRON 2 MG/ML
4 INJECTION INTRAMUSCULAR; INTRAVENOUS EVERY 6 HOURS PRN
Status: ACTIVE | OUTPATIENT
Start: 2024-12-17 | End: 2024-12-18

## 2024-12-17 RX ORDER — EPHEDRINE SULFATE 50 MG/ML
10 INJECTION, SOLUTION INTRAVENOUS
Status: ACTIVE | OUTPATIENT
Start: 2024-12-17 | End: 2024-12-18

## 2024-12-17 RX ORDER — METOCLOPRAMIDE HYDROCHLORIDE 5 MG/ML
10 INJECTION INTRAMUSCULAR; INTRAVENOUS ONCE
Status: COMPLETED | OUTPATIENT
Start: 2024-12-17 | End: 2024-12-17

## 2024-12-17 RX ORDER — HYDROMORPHONE HYDROCHLORIDE 1 MG/ML
0.4 INJECTION, SOLUTION INTRAMUSCULAR; INTRAVENOUS; SUBCUTANEOUS
Status: DISCONTINUED | OUTPATIENT
Start: 2024-12-17 | End: 2024-12-17 | Stop reason: HOSPADM

## 2024-12-17 RX ORDER — ACETAMINOPHEN 500 MG
1000 TABLET ORAL EVERY 6 HOURS
Status: DISCONTINUED | OUTPATIENT
Start: 2024-12-18 | End: 2024-12-20 | Stop reason: HOSPADM

## 2024-12-17 RX ORDER — OXYCODONE HCL 5 MG/5 ML
5 SOLUTION, ORAL ORAL
Status: DISCONTINUED | OUTPATIENT
Start: 2024-12-17 | End: 2024-12-17 | Stop reason: HOSPADM

## 2024-12-17 RX ORDER — VITAMIN A ACETATE, BETA CAROTENE, ASCORBIC ACID, CHOLECALCIFEROL, .ALPHA.-TOCOPHEROL ACETATE, DL-, THIAMINE MONONITRATE, RIBOFLAVIN, NIACINAMIDE, PYRIDOXINE HYDROCHLORIDE, FOLIC ACID, CYANOCOBALAMIN, CALCIUM CARBONATE, FERROUS FUMARATE, ZINC OXIDE, CUPRIC OXIDE 3080; 12; 120; 400; 1; 1.84; 3; 20; 22; 920; 25; 200; 27; 10; 2 [IU]/1; UG/1; MG/1; [IU]/1; MG/1; MG/1; MG/1; MG/1; MG/1; [IU]/1; MG/1; MG/1; MG/1; MG/1; MG/1
1 TABLET, FILM COATED ORAL
Status: DISCONTINUED | OUTPATIENT
Start: 2024-12-18 | End: 2024-12-20 | Stop reason: HOSPADM

## 2024-12-17 RX ORDER — HYDRALAZINE HYDROCHLORIDE 20 MG/ML
5 INJECTION INTRAMUSCULAR; INTRAVENOUS
Status: DISCONTINUED | OUTPATIENT
Start: 2024-12-17 | End: 2024-12-17 | Stop reason: HOSPADM

## 2024-12-17 RX ORDER — ACETAMINOPHEN 500 MG
1000 TABLET ORAL EVERY 6 HOURS
Status: COMPLETED | OUTPATIENT
Start: 2024-12-17 | End: 2024-12-18

## 2024-12-17 RX ORDER — DIPHENHYDRAMINE HYDROCHLORIDE 50 MG/ML
12.5 INJECTION INTRAMUSCULAR; INTRAVENOUS
Status: DISCONTINUED | OUTPATIENT
Start: 2024-12-17 | End: 2024-12-17 | Stop reason: HOSPADM

## 2024-12-17 RX ORDER — ONDANSETRON 2 MG/ML
4 INJECTION INTRAMUSCULAR; INTRAVENOUS EVERY 6 HOURS PRN
Status: DISCONTINUED | OUTPATIENT
Start: 2024-12-18 | End: 2024-12-20 | Stop reason: HOSPADM

## 2024-12-17 RX ORDER — OXYTOCIN 10 [USP'U]/ML
INJECTION, SOLUTION INTRAMUSCULAR; INTRAVENOUS PRN
Status: DISCONTINUED | OUTPATIENT
Start: 2024-12-17 | End: 2024-12-17 | Stop reason: SURG

## 2024-12-17 RX ORDER — SIMETHICONE 125 MG
125 TABLET,CHEWABLE ORAL 4 TIMES DAILY PRN
Status: DISCONTINUED | OUTPATIENT
Start: 2024-12-17 | End: 2024-12-20 | Stop reason: HOSPADM

## 2024-12-17 RX ORDER — SODIUM CHLORIDE, SODIUM LACTATE, POTASSIUM CHLORIDE, CALCIUM CHLORIDE 600; 310; 30; 20 MG/100ML; MG/100ML; MG/100ML; MG/100ML
2000 INJECTION, SOLUTION INTRAVENOUS PRN
Status: DISCONTINUED | OUTPATIENT
Start: 2024-12-17 | End: 2024-12-20 | Stop reason: HOSPADM

## 2024-12-17 RX ORDER — CEFAZOLIN SODIUM 1 G/3ML
INJECTION, POWDER, FOR SOLUTION INTRAMUSCULAR; INTRAVENOUS PRN
Status: DISCONTINUED | OUTPATIENT
Start: 2024-12-17 | End: 2024-12-17 | Stop reason: SURG

## 2024-12-17 RX ORDER — ONDANSETRON 4 MG/1
4 TABLET, ORALLY DISINTEGRATING ORAL EVERY 6 HOURS PRN
Status: DISCONTINUED | OUTPATIENT
Start: 2024-12-18 | End: 2024-12-20 | Stop reason: HOSPADM

## 2024-12-17 RX ORDER — CALCIUM CARBONATE 500 MG/1
1000 TABLET, CHEWABLE ORAL EVERY 6 HOURS PRN
Status: DISCONTINUED | OUTPATIENT
Start: 2024-12-17 | End: 2024-12-20 | Stop reason: HOSPADM

## 2024-12-17 RX ORDER — HYDROMORPHONE HYDROCHLORIDE 1 MG/ML
0.2 INJECTION, SOLUTION INTRAMUSCULAR; INTRAVENOUS; SUBCUTANEOUS
Status: DISCONTINUED | OUTPATIENT
Start: 2024-12-17 | End: 2024-12-17 | Stop reason: HOSPADM

## 2024-12-17 RX ORDER — HYDROMORPHONE HYDROCHLORIDE 1 MG/ML
0.4 INJECTION, SOLUTION INTRAMUSCULAR; INTRAVENOUS; SUBCUTANEOUS
Status: ACTIVE | OUTPATIENT
Start: 2024-12-17 | End: 2024-12-18

## 2024-12-17 RX ORDER — DIPHENHYDRAMINE HYDROCHLORIDE 50 MG/ML
25 INJECTION INTRAMUSCULAR; INTRAVENOUS EVERY 6 HOURS PRN
Status: DISCONTINUED | OUTPATIENT
Start: 2024-12-18 | End: 2024-12-20 | Stop reason: HOSPADM

## 2024-12-17 RX ORDER — IBUPROFEN 800 MG/1
800 TABLET, FILM COATED ORAL EVERY 8 HOURS PRN
Status: DISCONTINUED | OUTPATIENT
Start: 2024-12-21 | End: 2024-12-20 | Stop reason: HOSPADM

## 2024-12-17 RX ORDER — DOCUSATE SODIUM 100 MG/1
100 CAPSULE, LIQUID FILLED ORAL 2 TIMES DAILY PRN
Status: DISCONTINUED | OUTPATIENT
Start: 2024-12-17 | End: 2024-12-20 | Stop reason: HOSPADM

## 2024-12-17 RX ORDER — DEXAMETHASONE SODIUM PHOSPHATE 4 MG/ML
INJECTION, SOLUTION INTRA-ARTICULAR; INTRALESIONAL; INTRAMUSCULAR; INTRAVENOUS; SOFT TISSUE PRN
Status: DISCONTINUED | OUTPATIENT
Start: 2024-12-17 | End: 2024-12-17 | Stop reason: SURG

## 2024-12-17 RX ORDER — SODIUM CHLORIDE, SODIUM GLUCONATE, SODIUM ACETATE, POTASSIUM CHLORIDE AND MAGNESIUM CHLORIDE 526; 502; 368; 37; 30 MG/100ML; MG/100ML; MG/100ML; MG/100ML; MG/100ML
1000 INJECTION, SOLUTION INTRAVENOUS ONCE
Status: COMPLETED | OUTPATIENT
Start: 2024-12-17 | End: 2024-12-17

## 2024-12-17 RX ORDER — ACETAMINOPHEN 500 MG
1000 TABLET ORAL EVERY 6 HOURS PRN
Status: DISCONTINUED | OUTPATIENT
Start: 2024-12-21 | End: 2024-12-20 | Stop reason: HOSPADM

## 2024-12-17 RX ORDER — EPHEDRINE SULFATE 50 MG/ML
5 INJECTION, SOLUTION INTRAVENOUS
Status: DISCONTINUED | OUTPATIENT
Start: 2024-12-17 | End: 2024-12-17 | Stop reason: HOSPADM

## 2024-12-17 RX ORDER — OXYCODONE HYDROCHLORIDE 5 MG/1
10 TABLET ORAL EVERY 4 HOURS PRN
Status: DISCONTINUED | OUTPATIENT
Start: 2024-12-18 | End: 2024-12-20 | Stop reason: HOSPADM

## 2024-12-17 RX ORDER — OXYCODONE HYDROCHLORIDE 5 MG/1
5 TABLET ORAL EVERY 4 HOURS PRN
Status: DISCONTINUED | OUTPATIENT
Start: 2024-12-18 | End: 2024-12-20 | Stop reason: HOSPADM

## 2024-12-17 RX ORDER — DIPHENHYDRAMINE HCL 25 MG
25 TABLET ORAL EVERY 6 HOURS PRN
Status: DISCONTINUED | OUTPATIENT
Start: 2024-12-18 | End: 2024-12-20 | Stop reason: HOSPADM

## 2024-12-17 RX ORDER — HYDROMORPHONE HYDROCHLORIDE 1 MG/ML
0.2 INJECTION, SOLUTION INTRAMUSCULAR; INTRAVENOUS; SUBCUTANEOUS
Status: ACTIVE | OUTPATIENT
Start: 2024-12-17 | End: 2024-12-18

## 2024-12-17 RX ORDER — CITRIC ACID/SODIUM CITRATE 334-500MG
30 SOLUTION, ORAL ORAL ONCE
Status: COMPLETED | OUTPATIENT
Start: 2024-12-17 | End: 2024-12-17

## 2024-12-17 RX ORDER — KETOROLAC TROMETHAMINE 15 MG/ML
INJECTION, SOLUTION INTRAMUSCULAR; INTRAVENOUS PRN
Status: DISCONTINUED | OUTPATIENT
Start: 2024-12-17 | End: 2024-12-17 | Stop reason: SURG

## 2024-12-17 RX ORDER — IBUPROFEN 800 MG/1
800 TABLET, FILM COATED ORAL EVERY 8 HOURS
Status: DISCONTINUED | OUTPATIENT
Start: 2024-12-18 | End: 2024-12-20 | Stop reason: HOSPADM

## 2024-12-17 RX ORDER — BUPIVACAINE HYDROCHLORIDE 7.5 MG/ML
INJECTION, SOLUTION INTRASPINAL
Status: COMPLETED | OUTPATIENT
Start: 2024-12-17 | End: 2024-12-17

## 2024-12-17 RX ORDER — EPHEDRINE SULFATE 50 MG/ML
INJECTION, SOLUTION INTRAVENOUS PRN
Status: DISCONTINUED | OUTPATIENT
Start: 2024-12-17 | End: 2024-12-17 | Stop reason: SURG

## 2024-12-17 RX ORDER — MORPHINE SULFATE 0.5 MG/ML
INJECTION, SOLUTION EPIDURAL; INTRATHECAL; INTRAVENOUS PRN
Status: DISCONTINUED | OUTPATIENT
Start: 2024-12-17 | End: 2024-12-17 | Stop reason: SURG

## 2024-12-17 RX ORDER — ONDANSETRON 2 MG/ML
INJECTION INTRAMUSCULAR; INTRAVENOUS PRN
Status: DISCONTINUED | OUTPATIENT
Start: 2024-12-17 | End: 2024-12-17 | Stop reason: SURG

## 2024-12-17 RX ORDER — DIPHENHYDRAMINE HYDROCHLORIDE 50 MG/ML
25 INJECTION INTRAMUSCULAR; INTRAVENOUS EVERY 6 HOURS PRN
Status: ACTIVE | OUTPATIENT
Start: 2024-12-17 | End: 2024-12-18

## 2024-12-17 RX ORDER — OXYCODONE HYDROCHLORIDE 5 MG/1
5 TABLET ORAL EVERY 4 HOURS PRN
Status: ACTIVE | OUTPATIENT
Start: 2024-12-17 | End: 2024-12-18

## 2024-12-17 RX ORDER — LABETALOL HYDROCHLORIDE 5 MG/ML
5 INJECTION, SOLUTION INTRAVENOUS
Status: DISCONTINUED | OUTPATIENT
Start: 2024-12-17 | End: 2024-12-17 | Stop reason: HOSPADM

## 2024-12-17 RX ORDER — ONDANSETRON 2 MG/ML
4 INJECTION INTRAMUSCULAR; INTRAVENOUS
Status: DISCONTINUED | OUTPATIENT
Start: 2024-12-17 | End: 2024-12-17 | Stop reason: HOSPADM

## 2024-12-17 RX ORDER — KETOROLAC TROMETHAMINE 15 MG/ML
15 INJECTION, SOLUTION INTRAMUSCULAR; INTRAVENOUS EVERY 6 HOURS
Status: COMPLETED | OUTPATIENT
Start: 2024-12-17 | End: 2024-12-18

## 2024-12-17 RX ORDER — OXYCODONE HYDROCHLORIDE 5 MG/1
10 TABLET ORAL EVERY 4 HOURS PRN
Status: ACTIVE | OUTPATIENT
Start: 2024-12-17 | End: 2024-12-18

## 2024-12-17 RX ORDER — HYDROMORPHONE HYDROCHLORIDE 1 MG/ML
0.1 INJECTION, SOLUTION INTRAMUSCULAR; INTRAVENOUS; SUBCUTANEOUS
Status: DISCONTINUED | OUTPATIENT
Start: 2024-12-17 | End: 2024-12-17 | Stop reason: HOSPADM

## 2024-12-17 RX ORDER — CEFAZOLIN SODIUM 1 G/3ML
2 INJECTION, POWDER, FOR SOLUTION INTRAMUSCULAR; INTRAVENOUS ONCE
Status: DISCONTINUED | OUTPATIENT
Start: 2024-12-17 | End: 2024-12-17 | Stop reason: HOSPADM

## 2024-12-17 RX ORDER — OXYCODONE HCL 5 MG/5 ML
10 SOLUTION, ORAL ORAL
Status: DISCONTINUED | OUTPATIENT
Start: 2024-12-17 | End: 2024-12-17 | Stop reason: HOSPADM

## 2024-12-17 RX ORDER — PHENYLEPHRINE HYDROCHLORIDE 10 MG/ML
INJECTION, SOLUTION INTRAMUSCULAR; INTRAVENOUS; SUBCUTANEOUS PRN
Status: DISCONTINUED | OUTPATIENT
Start: 2024-12-17 | End: 2024-12-17 | Stop reason: SURG

## 2024-12-17 RX ADMIN — EPHEDRINE SULFATE 5 MG: 50 INJECTION, SOLUTION INTRAVENOUS at 08:13

## 2024-12-17 RX ADMIN — ONDANSETRON 4 MG: 2 INJECTION INTRAMUSCULAR; INTRAVENOUS at 07:46

## 2024-12-17 RX ADMIN — ACETAMINOPHEN 1000 MG: 500 TABLET ORAL at 17:45

## 2024-12-17 RX ADMIN — FENTANYL CITRATE 15 MCG: 50 INJECTION, SOLUTION INTRAMUSCULAR; INTRAVENOUS at 07:52

## 2024-12-17 RX ADMIN — SODIUM CHLORIDE, SODIUM GLUCONATE, SODIUM ACETATE, POTASSIUM CHLORIDE AND MAGNESIUM CHLORIDE: 526; 502; 368; 37; 30 INJECTION, SOLUTION INTRAVENOUS at 07:42

## 2024-12-17 RX ADMIN — KETOROLAC TROMETHAMINE 15 MG: 15 INJECTION, SOLUTION INTRAMUSCULAR; INTRAVENOUS at 08:30

## 2024-12-17 RX ADMIN — SODIUM CITRATE AND CITRIC ACID MONOHYDRATE 30 ML: 334; 500 SOLUTION ORAL at 07:27

## 2024-12-17 RX ADMIN — PHENYLEPHRINE HYDROCHLORIDE 20 MCG/MIN: 10 INJECTION INTRAVENOUS at 07:53

## 2024-12-17 RX ADMIN — HUMAN RHO(D) IMMUNE GLOBULIN 300 MCG: 1500 SOLUTION INTRAMUSCULAR; INTRAVENOUS at 23:33

## 2024-12-17 RX ADMIN — METOCLOPRAMIDE HYDROCHLORIDE 10 MG: 5 INJECTION INTRAMUSCULAR; INTRAVENOUS at 07:02

## 2024-12-17 RX ADMIN — EPHEDRINE SULFATE 5 MG: 50 INJECTION, SOLUTION INTRAVENOUS at 07:57

## 2024-12-17 RX ADMIN — ACETAMINOPHEN 1000 MG: 500 TABLET ORAL at 23:33

## 2024-12-17 RX ADMIN — OXYTOCIN 1000 ML: 10 INJECTION, SOLUTION INTRAMUSCULAR; INTRAVENOUS at 08:16

## 2024-12-17 RX ADMIN — OXYTOCIN 125 ML/HR: 10 INJECTION, SOLUTION INTRAMUSCULAR; INTRAVENOUS at 10:11

## 2024-12-17 RX ADMIN — OXYTOCIN 3 UNITS: 10 INJECTION, SOLUTION INTRAMUSCULAR; INTRAVENOUS at 08:16

## 2024-12-17 RX ADMIN — EPHEDRINE SULFATE 5 MG: 50 INJECTION, SOLUTION INTRAVENOUS at 08:01

## 2024-12-17 RX ADMIN — BUPIVACAINE HYDROCHLORIDE IN DEXTROSE 1.5 ML: 7.5 INJECTION, SOLUTION SUBARACHNOID at 07:52

## 2024-12-17 RX ADMIN — PHENYLEPHRINE HYDROCHLORIDE 50 MCG: 10 INJECTION INTRAVENOUS at 08:11

## 2024-12-17 RX ADMIN — FAMOTIDINE 20 MG: 10 INJECTION, SOLUTION INTRAVENOUS at 07:01

## 2024-12-17 RX ADMIN — CEFAZOLIN 2 G: 1 INJECTION, POWDER, FOR SOLUTION INTRAMUSCULAR; INTRAVENOUS at 07:55

## 2024-12-17 RX ADMIN — ACETAMINOPHEN 1000 MG: 500 TABLET ORAL at 11:37

## 2024-12-17 RX ADMIN — SODIUM CHLORIDE, SODIUM GLUCONATE, SODIUM ACETATE, POTASSIUM CHLORIDE AND MAGNESIUM CHLORIDE 1000 ML: 526; 502; 368; 37; 30 INJECTION, SOLUTION INTRAVENOUS at 06:30

## 2024-12-17 RX ADMIN — KETOROLAC TROMETHAMINE 15 MG: 15 INJECTION, SOLUTION INTRAMUSCULAR; INTRAVENOUS at 14:44

## 2024-12-17 RX ADMIN — KETOROLAC TROMETHAMINE 15 MG: 15 INJECTION, SOLUTION INTRAMUSCULAR; INTRAVENOUS at 20:56

## 2024-12-17 RX ADMIN — EPHEDRINE SULFATE 5 MG: 50 INJECTION, SOLUTION INTRAVENOUS at 08:41

## 2024-12-17 RX ADMIN — MORPHINE SULFATE 100 MCG: 0.5 INJECTION, SOLUTION EPIDURAL; INTRATHECAL; INTRAVENOUS at 07:52

## 2024-12-17 RX ADMIN — DEXAMETHASONE SODIUM PHOSPHATE 6 MG: 4 INJECTION INTRA-ARTICULAR; INTRALESIONAL; INTRAMUSCULAR; INTRAVENOUS; SOFT TISSUE at 07:55

## 2024-12-17 ASSESSMENT — PAIN DESCRIPTION - PAIN TYPE
TYPE: ACUTE PAIN;SURGICAL PAIN
TYPE: SURGICAL PAIN

## 2024-12-17 ASSESSMENT — PAIN SCALES - GENERAL: PAINLEVEL: 0 - NO PAIN

## 2024-12-17 ASSESSMENT — EDINBURGH POSTNATAL DEPRESSION SCALE (EPDS)
I HAVE BEEN ANXIOUS OR WORRIED FOR NO GOOD REASON: HARDLY EVER
THINGS HAVE BEEN GETTING ON TOP OF ME: NO, MOST OF THE TIME I HAVE COPED QUITE WELL
THE THOUGHT OF HARMING MYSELF HAS OCCURRED TO ME: NEVER
I HAVE FELT SAD OR MISERABLE: NO, NOT AT ALL
I HAVE BEEN SO UNHAPPY THAT I HAVE BEEN CRYING: NO, NEVER
I HAVE BLAMED MYSELF UNNECESSARILY WHEN THINGS WENT WRONG: YES, MOST OF THE TIME
I HAVE BEEN ABLE TO LAUGH AND SEE THE FUNNY SIDE OF THINGS: AS MUCH AS I ALWAYS COULD
I HAVE FELT SCARED OR PANICKY FOR NO GOOD REASON: NO, NOT AT ALL
I HAVE LOOKED FORWARD WITH ENJOYMENT TO THINGS: AS MUCH AS I EVER DID
I HAVE BEEN SO UNHAPPY THAT I HAVE HAD DIFFICULTY SLEEPING: NOT AT ALL

## 2024-12-17 ASSESSMENT — PATIENT HEALTH QUESTIONNAIRE - PHQ9
2. FEELING DOWN, DEPRESSED, IRRITABLE, OR HOPELESS: NOT AT ALL
SUM OF ALL RESPONSES TO PHQ9 QUESTIONS 1 AND 2: 0
1. LITTLE INTEREST OR PLEASURE IN DOING THINGS: NOT AT ALL

## 2024-12-17 NOTE — ANESTHESIA TIME REPORT
Anesthesia Start and Stop Event Times       Date Time Event    12/17/2024 0736 Ready for Procedure     0742 Anesthesia Start     0900 Anesthesia Stop          Responsible Staff  12/17/24      Name Role Begin End    Tisha Weller M.D. Anesth 0742 0900          Overtime Reason:  no overtime (within assigned shift)    Comments:

## 2024-12-17 NOTE — OP REPORT
DATE OF SERVICE:  2024     OPERATION:  Repeat low transverse  section.     SURGEON:  Clemente Dumas MD     ASSISTANT:  Corinne E. Capurro, MD     ANESTHESIOLOGIST:  Tisha Weller MD     ANESTHESIA:  Spinal.     PREOPERATIVE DIAGNOSES:  1.  A 39 and 4/7 week gestation.  2.  Previous  section for dystocia.  3.  Rh negative, non-sensitized.  4.  The patient is a devout Jew and would refuse blood transfusion.     POSTOPERATIVE DIAGNOSES:  1.  A 39 and 4/7 week gestation.  2.  Previous  section for dystocia.  3.  Rh negative, non-sensitized.  4.  The patient is a devout Jew and would refuse blood transfusion.  5.  Pelvic adhesions involving omentum , anterior uterine serosa , and anterior cul-de-sac.     COMPLICATIONS:  None.     ESTIMATED BLOOD LOSS:  600 mL.     FINDINGS:  Baby ---female, 1 minute Apgar 8, 5 minute Apgar 9, weight 3780 grams.     INDICATIONS:  This 29-year-old lady is now .  She came for repeat    at term.  She declines  sterilization.  She has devout Jehovah's   witness and is enrolled the bloodless program.     DESCRIPTION OF PROCEDURE:  The patient went to the OR.  Spinal anesthesia was   administered.  She was prepped and draped, I made a Pfannenstiel skin incision   by excising her large prominent keloid scar .  I   incised the subcutaneous fat.  I incised the rectus fascia transversely.  I   used scissors and electrocautery to dissect through scar tissue,    the rectus fascia from the rectus abdominis muscles.  I entered the peritoneum   with a hemostat well away from the bladder.  The peritoneal incision was   enlarged.  There were filmy and fibrous adhesions between the greater omentum,   the anterior uterine serosa, the anterior parietal peritoneum and there were   filmy adhesions in the anterior cul-de-sac.  All of these adhesions were   addressed with scissors, obtaining good access to the lower uterine  segment.    An Hakan O retractor was placed.  I made a low transverse myometrial   incision.  I pierced the membranes with a hemostat revealing clear fluid.   Baby's head was extracted from left occiput transverse position with no   difficulty.  I bulb suctioned the baby's mouth.  The shoulders and body   delivered easily.  Thirty seconds later, the cord was doubly clamped and cut.    The baby was handed off. The placenta and trailing membranes delivered   spontaneously.  I sponge curetted the endometrial cavity to make sure there   were no retained products of conception.  I closed two-thirds of the   myometrial thickness with running interlocking 0 Vicryl.  I completed closure   of the myometrium with a second layer of 0 Vicryl, imbricating the first   layer.  There was good approximation and hemostasis.  Fallopian tubes and   ovaries were inspected.  There were no adnexal masses evident.  The Hakan O   was removed.  I placed Seprafilm adhesion barrier over the hysterotomy site.    I closed the anterior parietal peritoneum and the posterior layer of the   rectus sheath with running 2-0 Vicryl.  I cauterized several small bleeders on   the surface of the rectus muscles, I placed Seprafilm adhesion barrier over   the rectus abdominis muscle midline.  I closed both layers of the rectus   fascia with running 0 PDS.  I closed Babar's fascia with running 3-0 Vicryl.    I cauterized several small subcutaneous bleeders resulted in good hemostasis.    The skin was approximated with Insorb resorbable subcutaneous staples,   one-half inch wide Steri-Strips and a Mepilex Ag dressing were placed.  Sponge   and needle counts were correct.        ______________________________  MD CORKY Keyes/MARSHALL    DD:  12/17/2024 09:13  DT:  12/17/2024 09:32    Job#:  [id number]

## 2024-12-17 NOTE — LACTATION NOTE
This note was copied from a baby's chart.  Initial Visit:    39w4d infant delivered via c section to a  mother 24 at 0815    Feeding Plan: breastfeeding    Breastfeeding History: none, she did not breast feed her first child    Medical History: No significant breast feeding risk factors noted     Yuri reports that her baby just breast fed for 20 minutes. She declined additional latch assistance at this time.     Breast feeding education provided: Education provided regarding the milk making process and supply in demand. Frequent skin to skin encouraged. Encouraged MOB to offer the breast to baby any time she is showing hunger cues (cues reviewed). Encouraged MOB to allow baby to self limit at the breast. Anticipatory guidance provided regarding typical  feeding behaviors in the first 24-48hrs, including cluster feeding. Proper positioning and latch technique verbalized. Education provided regarding the importance of achieving a deep latch with each feeding to ensure proper stimulation, milk transfer, and reduce the chance for nipple damage/pain.     Plan: Continue offering the breast to baby on cue, a minimum of 8 times every 24hrs. Skin to skin for each feeding. Hand expression and feed back colostrum (with RN assistance) if baby due to feed, but too sleepy or unable to latch. Do not limit baby's time at the breast. Reach out to RN/LC if experiencing pain with latch or if unable to latch baby independently.

## 2024-12-17 NOTE — ANESTHESIA POSTPROCEDURE EVALUATION
Patient: Yuri Sanchez    Procedure Summary       Date: 24 Room / Location: [facility] OR 02 / SURGERY LABOR AND DELIVERY    Anesthesia Start: 742 Anesthesia Stop: 900    Procedure: REPEAT  SECTION (Abdomen) Diagnosis: (PRIOR  SECTION - 39.4 WEEKS, delivered)    Surgeons: Clemente Dumas M.D. Responsible Provider: Tisha Weller M.D.    Anesthesia Type: spinal ASA Status: 2            Final Anesthesia Type: spinal  Last vitals  BP   Blood Pressure: 91/55    Temp   35.8 °C (96.5 °F)    Pulse   82   Resp   18    SpO2   92 %      Anesthesia Post Evaluation    Patient location during evaluation: PACU  Patient participation: complete - patient participated  Level of consciousness: awake and alert    Airway patency: patent  Anesthetic complications: no  Cardiovascular status: hemodynamically stable  Respiratory status: acceptable  Hydration status: euvolemic    PONV: none        There were no known notable events for this encounter.     Nurse Pain Score: 0 (NPRS)

## 2024-12-17 NOTE — OR SURGEON
Immediate Post OP Note    PreOp Diagnosis:   1.  A 39 and 4/7 week gestation.  2.  Previous  section for dystocia.  3.  Rh negative, non-sensitized.  4.  The patient is a devout Samaritan and would refuse transfusion of   all blood products as well as fresh frozen plasma.    PostOp Diagnosis:     1.  A 39 and 4/7 week gestation.  2.  Previous  section for dystocia.  3.  Rh negative, non-sensitized.  4.  The patient is a devout Samaritan and would refuse transfusion of   all blood products as well as fresh frozen plasma.  5.  Pelvic adhesions      Procedure(s):  REPEAT  SECTION - Wound Class: Clean Contaminated    Surgeon(s):  Corinne E Capurro, M.D. Bruce E Farringer, M.D.    Anesthesiologist/Type of Anesthesia:  Anesthesiologist: Tisha Weller M.D./Spinal    Surgical Staff:  Circulator: Leah Callahan R.N.  Scrub Person: Kelly BLACK&VINI Baby  Nurse: Radha Jernigan RMINO; Amelie Guidry RMINO    Specimens removed if any:  * No specimens in log *    Estimated Blood Loss: 600 cc    Findings: baby-female, APGARs 8-9 , 3780 grams;  post- adhesions (omentum, uterine serosa, ant CDS)    Complications: none        2024 9:04 AM Clemente Dumas M.D.

## 2024-12-17 NOTE — ANESTHESIA PREPROCEDURE EVALUATION
Case: [id number] Date/Time: 24 0715    Procedure: REPEAT  SECTION    Pre-op diagnosis: PRIOR  SECTION - 39.4 WEEKS    Location: [facility] OR 01 / SURGERY LABOR AND DELIVERY    Surgeons: Clemente Dumas M.D.            Relevant Problems   PULMONARY   (positive) Mild intermittent asthma without complication      with history of C/S.  No issues with previous labor epidural.  NKDA.  Reports asthma is only around cats, no use of rescue inhaler, no history of hospitalization or intubation for asthma.  Pt is a Mosque and declines blood products and albumin.  No previous history of PPH but discussed risk of PPH and patient agrees to proceed.      Physical Exam    Airway   Mallampati: II  TM distance: >3 FB  Neck ROM: full       Cardiovascular - normal exam  Rhythm: regular  Rate: normal  (-) murmur     Dental - normal exam           Pulmonary - normal exam  Breath sounds clear to auscultation     Abdominal    Neurological - normal exam                 Anesthesia Plan    ASA 2       Plan - spinal   Neuraxial block will be primary anesthetic            Induction: intravenous    Postoperative Plan: Postoperative administration of opioids is intended.    Pertinent diagnostic labs and testing reviewed    Informed Consent:    Anesthetic plan and risks discussed with patient.    Use of blood products discussed with: patient whom did not consent to blood products.   Special considerations: Shinto.

## 2024-12-17 NOTE — ANESTHESIA PROCEDURE NOTES
Spinal Block    Date/Time: 12/17/2024 7:52 AM    Performed by: Tisha Weller M.D.  Authorized by: Tisha Weller M.D.    Start Time:  12/17/2024 7:52 AM  Reason for Block: primary anesthetic    patient identified, IV checked, site marked, risks and benefits discussed, surgical consent, monitors and equipment checked, pre-op evaluation and timeout performed    Patient Position:  Sitting  Prep: ChloraPrep, patient draped and sterile technique    Monitoring:  Blood pressure, continuous pulse oximetry and heart rate  Approach:  Midline  Location:  L3-4  Injection Technique:  Single-shot  Skin infiltration:  Lidocaine  Strength:  1%  Dose:  3ml  Needle Type:  Pencan  Needle Gauge:  25 G  CSF flowing pre/post injection:  Yes  Sensory Level:  T4

## 2024-12-17 NOTE — PROGRESS NOTES
Pt here for scheduled repeat  section. Pt is  with EDC  making her 39&4. Pt is bloodless, bloodless consent forms signed, band on chart and on pt right arm. Durable power of  scanned into chart.     0741: Pt into OR 2.     0815: Delivery of viable female infant.     0858: Pt into PACU 3 for recovery.     1008: Pt up to postpartum via gurney, infant in arms. Report to Mayra FREEMAN. POC discussed.

## 2024-12-18 PROCEDURE — 770002 HCHG ROOM/CARE - OB PRIVATE (112)

## 2024-12-18 PROCEDURE — 700111 HCHG RX REV CODE 636 W/ 250 OVERRIDE (IP): Mod: JZ | Performed by: STUDENT IN AN ORGANIZED HEALTH CARE EDUCATION/TRAINING PROGRAM

## 2024-12-18 PROCEDURE — 90656 IIV3 VACC NO PRSV 0.5 ML IM: CPT | Performed by: OBSTETRICS & GYNECOLOGY

## 2024-12-18 PROCEDURE — 700111 HCHG RX REV CODE 636 W/ 250 OVERRIDE (IP): Performed by: OBSTETRICS & GYNECOLOGY

## 2024-12-18 PROCEDURE — 700102 HCHG RX REV CODE 250 W/ 637 OVERRIDE(OP): Performed by: OBSTETRICS & GYNECOLOGY

## 2024-12-18 PROCEDURE — A9270 NON-COVERED ITEM OR SERVICE: HCPCS | Performed by: STUDENT IN AN ORGANIZED HEALTH CARE EDUCATION/TRAINING PROGRAM

## 2024-12-18 PROCEDURE — 700102 HCHG RX REV CODE 250 W/ 637 OVERRIDE(OP): Performed by: STUDENT IN AN ORGANIZED HEALTH CARE EDUCATION/TRAINING PROGRAM

## 2024-12-18 PROCEDURE — 90471 IMMUNIZATION ADMIN: CPT

## 2024-12-18 PROCEDURE — A9270 NON-COVERED ITEM OR SERVICE: HCPCS | Performed by: OBSTETRICS & GYNECOLOGY

## 2024-12-18 RX ADMIN — KETOROLAC TROMETHAMINE 15 MG: 15 INJECTION, SOLUTION INTRAMUSCULAR; INTRAVENOUS at 02:15

## 2024-12-18 RX ADMIN — PRENATAL WITH FERROUS FUM AND FOLIC ACID 1 TABLET: 3080; 920; 120; 400; 22; 1.84; 3; 20; 10; 1; 12; 200; 27; 25; 2 TABLET ORAL at 18:27

## 2024-12-18 RX ADMIN — OXYCODONE 5 MG: 5 TABLET ORAL at 22:19

## 2024-12-18 RX ADMIN — ACETAMINOPHEN 1000 MG: 500 TABLET ORAL at 11:46

## 2024-12-18 RX ADMIN — ACETAMINOPHEN 1000 MG: 500 TABLET ORAL at 18:27

## 2024-12-18 RX ADMIN — ACETAMINOPHEN 1000 MG: 500 TABLET ORAL at 05:51

## 2024-12-18 RX ADMIN — OXYCODONE 5 MG: 5 TABLET ORAL at 15:15

## 2024-12-18 RX ADMIN — INFLUENZA A VIRUS A/VICTORIA/4897/2022 IVR-238 (H1N1) ANTIGEN (FORMALDEHYDE INACTIVATED), INFLUENZA A VIRUS A/CALIFORNIA/122/2022 SAN-022 (H3N2) ANTIGEN (FORMALDEHYDE INACTIVATED), AND INFLUENZA B VIRUS B/MICHIGAN/01/2021 ANTIGEN (FORMALDEHYDE INACTIVATED) 0.5 ML: 15; 15; 15 INJECTION, SUSPENSION INTRAMUSCULAR at 08:53

## 2024-12-18 RX ADMIN — IBUPROFEN 800 MG: 800 TABLET, FILM COATED ORAL at 13:40

## 2024-12-18 RX ADMIN — IBUPROFEN 800 MG: 800 TABLET, FILM COATED ORAL at 22:00

## 2024-12-18 RX ADMIN — KETOROLAC TROMETHAMINE 15 MG: 15 INJECTION, SOLUTION INTRAMUSCULAR; INTRAVENOUS at 08:52

## 2024-12-18 ASSESSMENT — PAIN DESCRIPTION - PAIN TYPE
TYPE: SURGICAL PAIN
TYPE: SURGICAL PAIN
TYPE: ACUTE PAIN
TYPE: SURGICAL PAIN
TYPE: SURGICAL PAIN

## 2024-12-18 NOTE — PROGRESS NOTES
POD #1 s/p RLTCS    S:  Doing well. Pain is well controlled.  She is working on breast feeding.  She denies nausea/vomiting/fevers/chills/night sweats.  She has moderate lochia.  She has gotten up to the bathroom and is voiding spontaneously.      O:  /66   Pulse 72   Temp 36.3 °C (97.3 °F) (Temporal)   Resp 17   Wt 83.9 kg (185 lb)   SpO2 98%       A, A, and O x 3 NAD    FF U/1    No c/c/e    Incision: clean/dry/intact.  Mepilex dressing in place.    Recent Labs     12/17/24  0610 12/17/24  1911   WBC 12.2* 14.6*   RBC 3.86* 3.38*   HEMOGLOBIN 12.3 10.9*   HEMATOCRIT 36.6* 31.9*   MCV 94.8 94.4   MCH 31.9 32.2   RDW 45.9 45.4   PLATELETCT 163* 164   MPV 12.3 12.3   NEUTSPOLYS 74.30*  --    LYMPHOCYTES 17.50*  --    MONOCYTES 5.50  --    EOSINOPHILS 1.60  --    BASOPHILS 0.40  --      A/P: POD #1 s/p RLTCS      Ambulate TID.  ADAT.  Continue cares.  Begin folitab for mild acute blood loss anemia.  Work on breast feeding.

## 2024-12-18 NOTE — CARE PLAN
The patient is Stable - Low risk of patient condition declining or worsening    Shift Goals  Clinical Goals: adequate urine output; rest  Patient Goals: pain management    Progress made toward(s) clinical / shift goals:  Pt had adequate urine output through the Vo catheter, catheter removed, per order. Pt slept with minimal interruptions for care.    Patient is not progressing towards the following goals:

## 2024-12-18 NOTE — PROGRESS NOTES
1014- Patient arrived to Room S341.  1025- Report received from ESTER Lynn RN.  Fundus firm, lochia scant to light, pads changed.  1035- Patient assessment done.  IV patent.  Sequential stockings on.  Indwelling catheter to gravity.  Island silver dressing is clean, dry, and intact.  Discussed pain management with patient, to include MD orders for scheduled and prn pain medications.  Patient oriented to room and call system.  Reviewed plan of care.  Patient verbalized understanding.  FOB at bedside.  1445- Patient reported feeling dizzy and tired.  Pt declined to get up out of bed at this time.  Patient encouraged to rest.  1615- Patient feeding infant at this time and wants to wait until after infant's feeding to get out of bed.  1745- Patient up to sit at the edge of the bed, then stand at the bedside.  Patient denied dizziness and ambulated to the bathroom with hand-held assist.  Patient ambulated with steady gait.  Concetta-care and catheter care done.  Patient assisted to put on pads and underwear.  Patient ambulated back to bed independently.  Patient tolerated well.

## 2024-12-18 NOTE — PROGRESS NOTES
1915: Received bedside report from day shift RN, Mayra. Greeted pt and SO at the bedside. Whiteboard updated. Dura VS and urine output obtained.    2035: Completed assessment. Bps 90s/50s, pt denies dizziness. Pt complains of pain at this time. Pain medication was administered. IV in place, no signs of phlebitis or infiltration. POC discussed: pain control, lochia, hydration, and plan for Vo catheter removal and ambulation. Reinforced education on emergency and non-emergency call light system, and bed safety.     Educated pt to press the red emergency button on the side rails if she experiences:  Sudden dizziness  Gushing of blood  Soaking a pad front and back within an hour  Passing a clot bigger than an egg size  Infant cyanotic    Pt verbalized understanding. Call light placed within reach.     2045: Pt ambulated to the restroom with standby assistance, gait steady; no dizziness. Per order, Vo catheter removed. Educated pt that she has within 6 hours to void in the hat provided in the toilet. Pt verbalized understanding.     2333: Education completed on the purpose of Rhogam. Pt desires thsi medication, medication administered. See MAR.    2340: Pt voided 400 mL in the hat.

## 2024-12-18 NOTE — CARE PLAN
The patient is Stable - Low risk of patient condition declining or worsening    Shift Goals  Clinical Goals: Pain control  Patient Goals: pain management    Progress made toward(s) clinical / shift goals:  Pain well controlled     Patient is not progressing towards the following goals:

## 2024-12-18 NOTE — CARE PLAN
The patient is Stable - Low risk of patient condition declining or worsening    Shift Goals  Clinical Goals: Maintain fundus firm, lochia light; pain management  Patient Goals: pain management    Progress made toward(s) clinical / shift goals:  Fundus firm, lochia scant to light; pt reported pain relief with scheduled pain medications.

## 2024-12-19 PROCEDURE — A9270 NON-COVERED ITEM OR SERVICE: HCPCS | Performed by: OBSTETRICS & GYNECOLOGY

## 2024-12-19 PROCEDURE — 700102 HCHG RX REV CODE 250 W/ 637 OVERRIDE(OP): Performed by: OBSTETRICS & GYNECOLOGY

## 2024-12-19 PROCEDURE — 770002 HCHG ROOM/CARE - OB PRIVATE (112)

## 2024-12-19 RX ADMIN — IBUPROFEN 800 MG: 800 TABLET, FILM COATED ORAL at 13:13

## 2024-12-19 RX ADMIN — ACETAMINOPHEN 1000 MG: 500 TABLET ORAL at 13:13

## 2024-12-19 RX ADMIN — ACETAMINOPHEN 1000 MG: 500 TABLET ORAL at 07:17

## 2024-12-19 RX ADMIN — MAGNESIUM HYDROXIDE 30 ML: 1200 LIQUID ORAL at 11:24

## 2024-12-19 RX ADMIN — DOCUSATE SODIUM 100 MG: 100 CAPSULE, LIQUID FILLED ORAL at 04:00

## 2024-12-19 RX ADMIN — OXYCODONE 10 MG: 5 TABLET ORAL at 04:00

## 2024-12-19 RX ADMIN — Medication 1 TABLET: at 08:16

## 2024-12-19 RX ADMIN — DOCUSATE SODIUM 100 MG: 100 CAPSULE, LIQUID FILLED ORAL at 17:21

## 2024-12-19 RX ADMIN — OXYCODONE 5 MG: 5 TABLET ORAL at 19:41

## 2024-12-19 RX ADMIN — OXYCODONE 5 MG: 5 TABLET ORAL at 15:34

## 2024-12-19 RX ADMIN — ACETAMINOPHEN 1000 MG: 500 TABLET ORAL at 01:17

## 2024-12-19 RX ADMIN — ACETAMINOPHEN 1000 MG: 500 TABLET ORAL at 18:30

## 2024-12-19 RX ADMIN — OXYCODONE 5 MG: 5 TABLET ORAL at 08:19

## 2024-12-19 RX ADMIN — PRENATAL WITH FERROUS FUM AND FOLIC ACID 1 TABLET: 3080; 920; 120; 400; 22; 1.84; 3; 20; 10; 1; 12; 200; 27; 25; 2 TABLET ORAL at 17:21

## 2024-12-19 RX ADMIN — IBUPROFEN 800 MG: 800 TABLET, FILM COATED ORAL at 07:18

## 2024-12-19 RX ADMIN — SIMETHICONE 125 MG: 125 TABLET, CHEWABLE ORAL at 04:00

## 2024-12-19 ASSESSMENT — PAIN DESCRIPTION - PAIN TYPE
TYPE: ACUTE PAIN;SURGICAL PAIN
TYPE: SURGICAL PAIN
TYPE: SURGICAL PAIN
TYPE: ACUTE PAIN

## 2024-12-19 NOTE — PROGRESS NOTES
Post Partum Progress Note    Name:   Yuri Sanchez   Date/Time:  2024 - 7:48 AM  Chief Admitting Dx:  Indication for care in labor or delivery [O75.9]  Delivery Type:   for repeat  Post-Op/Post Partum Days #:  2  PREOPERATIVE DIAGNOSES:  1.  A 39 and 4/7 week gestation.  2.  Previous  section for dystocia.  3.  Rh negative, non-sensitized.  4.  The patient is a devout Congregational and would refuse blood transfusion.     POSTOPERATIVE DIAGNOSES:  1.  A 39 and 4/7 week gestation.  2.  Previous  section for dystocia.  3.  Rh negative, non-sensitized.  4.  The patient is a devout Congregational and would refuse blood transfusion.  5.  Pelvic adhesions involving omentum , anterior uterine serosa , and anterior cul-de-sac.     Subjective:  Pt had a sleepless night secondary to pain. Pt is ambulating in the room. Pt wants to stay another night.  Abdominal pain: yes  Ambulating:   yes  Tolerating liquids:  yes  Tolerating food:  yes common adult  Flatus:   yes      Vitals:    24 0620 24 1000 24 1805 24 0533   BP: 105/66 91/54 100/66 114/75   Pulse: 72 71 81 82   Resp:    Temp: 36.3 °C (97.3 °F) 36.6 °C (97.8 °F) 36.1 °C (97 °F) 36.6 °C (97.9 °F)   TempSrc: Temporal Temporal Temporal Temporal   SpO2: 98% 96% 96% 95%   Weight:           Exam:  Gen:NAD  Abdomen: Abdomen soft, non-tender. BS normal. No masses,  No organomegaly  Fundal Tenderness:  no  Fundus Firm: yes/mepilex dressing in place  Incision: dry and intact/mepilex dressing in place  Below umbilicus: yes  Perineum: perineum intact  Lochia: mild  Extremities: 1+ edema extremities, peripheral pulses and reflexes normal    Meds:  Current Facility-Administered Medications   Medication Dose    oxytocin (Pitocin) infusion (for post delivery)  125 mL/hr    oxytocin (Pitocin) injection 10 Units  10 Units    lactated ringers infusion  2,000 mL    ibuprofen (Motrin) tablet 800 mg  800 mg     Followed by    [START ON 2024] ibuprofen (Motrin) tablet 800 mg  800 mg    acetaminophen (Tylenol) tablet 1,000 mg  1,000 mg    Followed by    [START ON 2024] acetaminophen (Tylenol) tablet 1,000 mg  1,000 mg    oxyCODONE immediate-release (Roxicodone) tablet 5 mg  5 mg    oxyCODONE immediate-release (Roxicodone) tablet 10 mg  10 mg    ondansetron (Zofran) syringe/vial injection 4 mg  4 mg    Or    ondansetron (Zofran ODT) dispertab 4 mg  4 mg    diphenhydrAMINE (Benadryl) tablet/capsule 25 mg  25 mg    Or    diphenhydrAMINE (Benadryl) injection 25 mg  25 mg    docusate sodium (Colace) capsule 100 mg  100 mg    prenatal plus vitamin (Stuartnatal 1+1) 27-1 MG tablet 1 Tablet  1 Tablet    magnesium hydroxide (Milk Of Magnesia) suspension 30 mL  30 mL    simethicone (Mylicon) chewable tablet 125 mg  125 mg    calcium carbonate (Tums) chewable tab 1,000 mg  1,000 mg       Labs:   Recent Labs     24  0610 24  1911   WBC 12.2* 14.6*   RBC 3.86* 3.38*   HEMOGLOBIN 12.3 10.9*   HEMATOCRIT 36.6* 31.9*   MCV 94.8 94.4   MCH 31.9 32.2   MCHC 33.6 34.2   RDW 45.9 45.4   PLATELETCT 163* 164   MPV 12.3 12.3       Assessment/Plan:  Chief Admitting Dx:  Indication for care in labor or delivery [O75.9]  Delivery Type:   for repeat  1-Post op-Pt is stable. Continue routine post partum care.   Continue to ambulate. Possible d/c tomorrow am.   2-Asymptomatic anemia-start Folitab qd. Pt asymptomatic  3-RH negative-Rhogam if baby is RH positive      Nirmala Rosario M.D.

## 2024-12-19 NOTE — LACTATION NOTE
"Follow-up Lactation Consultation:    Met with Yuri and her new baby girl to provide lactation support. She states that  breastfeeding is going, \"much better than with my first child\". Infant has been regularly latching and feeding vigorously per maternal report.    Infant is currently asleep in her mother's arms. Yuri reports that baby fed just over an hour ago, and she states that she is not in need of help with latching at this time. We verbally reviewed latch and positioning techniques, and Yuri is encouraged to request assistance with future feedings, as needed.     feeding patterns reviewed. Frequent skin-to-skin and cue-based feeding is encouraged; at least 8 feeds per 24 hours. Reviewed the milk making process, inclusive of supply and demand. Discussed signs of deep, asymmetric latch, and the importance of maintaining good latch to avoid pain/nipple damage and maximize milk transfer. Yuri is to offer both breasts at each feeding, and baby should be allowed to self-limit time at breast. Discouraged introduction of artificial nipples during establishment of milk supply, unless medically indicated.    Feeding plan:     Continue with cue-based breastfeeding, at least once every three hours.    Yuri is provided with the opportunity to ask questions. These have been answered to her satisfaction. She is encouraged to call RN/lactation for additional breastfeeding assistance, as needed, throughout remainder of hospital stay.       Encouraged follow up with [facility] and/or support circles for outpatient lactation support.   [facility] Breastfeeding Resource list provided.  "

## 2024-12-19 NOTE — LACTATION NOTE
Yuri states that Janneth is nursing well and at this time she has no need for assistance. Yuri denies any questions or concerns at this time. MOB encouraged to call for RN/Lactation assistance as needed.    She has a copy of [facility] Lactation support.

## 2024-12-19 NOTE — PROGRESS NOTES
Assumed care of patient at 1900. Received bedside report from day RN Kelle. Patient A&Ox 4, on Room Air, Reporting a pain level of 2. Call light within reach, belongings within reach, fall precautions in place, bed in lowest position. Patient does not have any other needs at this time.   Dad at bedside with baby    POC was discussed with patient. All questions were answered. Patient verbalized understanding.

## 2024-12-19 NOTE — CARE PLAN
The patient is Stable - Low risk of patient condition declining or worsening    Shift Goals  Clinical Goals: Pain Control  Patient Goals: Pain  Family Goals: Update POC    Progress made toward(s) clinical / shift goals:  Pain well controlled     Patient is not progressing towards the following goals:

## 2024-12-19 NOTE — PROGRESS NOTES
Assumed care of patient at change of shift. Discussed POC with patient and answered all questions.    no

## 2024-12-20 ENCOUNTER — PHARMACY VISIT (OUTPATIENT)
Dept: PHARMACY | Facility: MEDICAL CENTER | Age: 29
End: 2024-12-20
Payer: COMMERCIAL

## 2024-12-20 VITALS
OXYGEN SATURATION: 98 % | BODY MASS INDEX: 37.37 KG/M2 | SYSTOLIC BLOOD PRESSURE: 95 MMHG | DIASTOLIC BLOOD PRESSURE: 56 MMHG | TEMPERATURE: 97.6 F | WEIGHT: 185 LBS | RESPIRATION RATE: 18 BRPM | HEART RATE: 71 BPM

## 2024-12-20 PROBLEM — N85.A UTERINE SCAR FROM PREVIOUS CESAREAN DELIVERY: Status: ACTIVE | Noted: 2024-12-20

## 2024-12-20 PROCEDURE — A9270 NON-COVERED ITEM OR SERVICE: HCPCS | Performed by: OBSTETRICS & GYNECOLOGY

## 2024-12-20 PROCEDURE — 700102 HCHG RX REV CODE 250 W/ 637 OVERRIDE(OP): Performed by: OBSTETRICS & GYNECOLOGY

## 2024-12-20 PROCEDURE — RXMED WILLOW AMBULATORY MEDICATION CHARGE: Performed by: OBSTETRICS & GYNECOLOGY

## 2024-12-20 RX ORDER — IBUPROFEN 800 MG/1
800 TABLET, FILM COATED ORAL EVERY 8 HOURS PRN
Qty: 21 TABLET | Refills: 0 | Status: SHIPPED | OUTPATIENT
Start: 2024-12-20 | End: 2024-12-27

## 2024-12-20 RX ORDER — OXYCODONE HYDROCHLORIDE 5 MG/1
2.5-5 TABLET ORAL EVERY 6 HOURS PRN
Qty: 16 TABLET | Refills: 0 | Status: SHIPPED | OUTPATIENT
Start: 2024-12-20 | End: 2024-12-24

## 2024-12-20 RX ADMIN — OXYCODONE 10 MG: 5 TABLET ORAL at 00:12

## 2024-12-20 RX ADMIN — Medication 1 TABLET: at 06:48

## 2024-12-20 RX ADMIN — ACETAMINOPHEN 1000 MG: 500 TABLET ORAL at 06:47

## 2024-12-20 RX ADMIN — IBUPROFEN 800 MG: 800 TABLET, FILM COATED ORAL at 06:48

## 2024-12-20 RX ADMIN — IBUPROFEN 800 MG: 800 TABLET, FILM COATED ORAL at 00:14

## 2024-12-20 RX ADMIN — ACETAMINOPHEN 1000 MG: 500 TABLET ORAL at 00:13

## 2024-12-20 ASSESSMENT — PAIN DESCRIPTION - PAIN TYPE: TYPE: SURGICAL PAIN

## 2024-12-20 NOTE — PROGRESS NOTES
Discussed discharge education, and follow up information for infant and MOB. Meds to beds delivered by nursing staff. Infant and MOB's bands matched. Cord clamp off. Cuddles removed. Infant placed in car seat by MOB. Checked per RN. Infant and MOB discharged in stable condition.

## 2024-12-20 NOTE — CARE PLAN
The patient is Stable - Low risk of patient condition declining or worsening    Shift Goals  Clinical Goals: Pain control  Patient Goals: Pain Control Bowel Movement  Family Goals: Update POC    Progress made toward(s) clinical / shift goals:  Pain well controlled     Patient is not progressing towards the following goals:

## 2024-12-20 NOTE — LACTATION NOTE
Follow up lactation visit:    Met with Yuri and Janneth to provide lactation support prior to discharging home later today. Janneth has gained three ounces over the past 24 hours (for a current loss of 5%). Yuri's breasts are feeling full, but not uncomfortable. We reviewed comfort measures, for use if she develops any s/s of engorgement. She reports that her nipples remain intact and non-tender, and Yuri is feeling confident independently latching and positioning baby at breast. She denies any lactation related questions or concerns at this time.     Feeding Plan:    Continue with cue-based breastfeeding, at least once every three hours, for a total of 8+ feeds per 24 hours.     Follow up with outpatient lactation care provider of choice.

## 2024-12-20 NOTE — DISCHARGE INSTRUCTIONS

## 2024-12-20 NOTE — CARE PLAN
The patient is Stable - Low risk of patient condition declining or worsening    Shift Goals  Clinical Goals: Pain Control, Bowel movement  Patient Goals: Pain Control Bowel Movement  Family Goals: Update POC    Progress made toward(s) clinical / shift goals:    Problem: Knowledge Deficit - Postpartum  Goal: Patient will verbalize and demonstrate understanding of self and infant care  Outcome: Progressing     Problem: Psychosocial - Postpartum  Goal: Patient will verbalize and demonstrate effective bonding and parenting behavior  Outcome: Progressing     Problem: Altered Physiologic Condition  Goal: Patient physiologically stable as evidenced by normal lochia, palpable uterine involution and vitals within normal limits  Outcome: Progressing     Problem: Infection - Postpartum  Goal: Postpartum patient will be free of signs and symptoms of infection  Outcome: Progressing     Problem: Bowel Elimination - Post Surgical  Goal: Patient will resume regular bowel sounds and function with no discomfort or distention  Outcome: Progressing     Problem: Pain - Standard  Goal: Alleviation of pain or a reduction in pain to the patient’s comfort goal  Outcome: Progressing       Patient is not progressing towards the following goals:

## 2024-12-20 NOTE — DISCHARGE SUMMARY
Discharge Summary:      Yuri Sanchez    Admit Date:   2024  Discharge Date:  2024     Admitting diagnosis:  Indication for care in labor or delivery [O75.9]  Discharge Diagnosis: Status post  for repeat.  Pregnancy Complications:     OPERATION:  Repeat low transverse  section.     SURGEON:  Clemente Dumas MD     ASSISTANT:  Corinne E. Capurro, MD     ANESTHESIOLOGIST:  Tisha Weller MD     ANESTHESIA:  Spinal.     PREOPERATIVE DIAGNOSES:  1.  A 39 and 4/7 week gestation.  2.  Previous  section for dystocia.  3.  Rh negative, non-sensitized.  4.  The patient is a devout Islam and would refuse blood transfusion.     POSTOPERATIVE DIAGNOSES:  1.  A 39 and 4/7 week gestation.  2.  Previous  section for dystocia.  3.  Rh negative, non-sensitized.  4.  The patient is a devout Islam and would refuse blood transfusion.  5.  Pelvic adhesions involving omentum , anterior uterine serosa , and anterior cul-de-sac.     COMPLICATIONS:  None.     ESTIMATED BLOOD LOSS:  600 mL.     FINDINGS:  Baby ---female, 1 minute Apgar 8, 5 minute Apgar 9, weight 3780 grams.         Tubal Ligation:  no        History:  Past Medical History:   Diagnosis Date    Asthma     Asthma attack     Back pain     Bronchitis     Chest tightness     Chickenpox     Cough     Difficulty breathing     Gasping for breath     Heart burn     Painful breathing     Pregnant     Shortness of breath     Sputum production     Wears glasses     Wheezing      OB History    Para Term  AB Living   2 2 2     2   SAB IAB Ectopic Molar Multiple Live Births           0 2      # Outcome Date GA Lbr David/2nd Weight Sex Type Anes PTL Lv   2 Term 24 39w4d  3.78 kg (8 lb 5.3 oz) F CS-LTranv Spinal N SHARMIN   1 Term 04/10/20 41w2d  3.63 kg (8 lb) F CS-LTranv EPI N SHARMIN      Complications: Fetal Intolerance        Other misc and Bloodless  Patient Active Problem List    Diagnosis Date  Noted    Uterine scar from previous  delivery 2024    Indication for care in labor or delivery 2024    Encounter for initial prescription of vaginal ring hormonal contraceptive 2023    Allergy to cats 2022    Labor and delivery indication for care or intervention 2020    Arrest of dilation, delivered, current hospitalization 2020    Postoperative pain 2020    Less than 8 weeks gestation of pregnancy 2019    Flexural eczema 2019    Mild intermittent asthma without complication 2018    BMI 36.0-36.9,adult 2018        Hospital Course:   29 y.o. , now para 2, was admitted with the above mentioned diagnosis, underwent ,  for repeat. Patient postpartum course was unremarkable, with progressive advancement in diet , ambulation and toleration of oral analgesia. Patient without complaints today and desires discharge.      Vitals:    24 1000 24 1805 24 0533 24 1810   BP: 91/54 100/66 114/75 116/73   Pulse: 71 81 82 68   Resp:    Temp: 36.6 °C (97.8 °F) 36.1 °C (97 °F) 36.6 °C (97.9 °F) 36.4 °C (97.5 °F)   TempSrc: Temporal Temporal Temporal Temporal   SpO2: 96% 96% 95% 96%   Weight:           Current Facility-Administered Medications   Medication Dose    ferrous sulfate-c-folic acid (Folitab 500) tablet 1 Tablet  1 Tablet    oxytocin (Pitocin) infusion (for post delivery)  125 mL/hr    oxytocin (Pitocin) injection 10 Units  10 Units    lactated ringers infusion  2,000 mL    ibuprofen (Motrin) tablet 800 mg  800 mg    acetaminophen (Tylenol) tablet 1,000 mg  1,000 mg    oxyCODONE immediate-release (Roxicodone) tablet 5 mg  5 mg    oxyCODONE immediate-release (Roxicodone) tablet 10 mg  10 mg    ondansetron (Zofran) syringe/vial injection 4 mg  4 mg    Or    ondansetron (Zofran ODT) dispertab 4 mg  4 mg    diphenhydrAMINE (Benadryl) tablet/capsule 25 mg  25 mg    Or    diphenhydrAMINE (Benadryl) injection  25 mg  25 mg    docusate sodium (Colace) capsule 100 mg  100 mg    prenatal plus vitamin (Stuartnatal 1+1) 27-1 MG tablet 1 Tablet  1 Tablet    magnesium hydroxide (Milk Of Magnesia) suspension 30 mL  30 mL    simethicone (Mylicon) chewable tablet 125 mg  125 mg    calcium carbonate (Tums) chewable tab 1,000 mg  1,000 mg       Exam:  Breast Exam: Inspection negative. No nipple discharge or bleeding. No masses or nodularity palpable  Abdomen: Abdomen soft, non-tender. BS normal. No masses,  No organomegaly  Fundus Non Tender: yes  Incision: no evidence of infection, separation or keloid formation.  Perineum: perineum intact  Extremity: extremities, peripheral pulses and reflexes normal     Labs:  Recent Labs     12/17/24  0610 12/17/24  1911   WBC 12.2* 14.6*   RBC 3.86* 3.38*   HEMOGLOBIN 12.3 10.9*   HEMATOCRIT 36.6* 31.9*   MCV 94.8 94.4   MCH 31.9 32.2   MCHC 33.6 34.2   RDW 45.9 45.4   PLATELETCT 163* 164   MPV 12.3 12.3        Activity:   Discharge to home  Pelvic Rest x 6 weeks    Assessment:  normal postpartum course  Discharge Assessment: No areas of skin breakdown/redness; surgical incision intact/healing     Follow up: Dr dumas, 2 wks     Discharge Meds:   Current Outpatient Medications   Medication Sig Dispense Refill    ibuprofen (MOTRIN) 800 MG Tab Take 1 Tablet by mouth every 8 hours as needed for Moderate Pain or Mild Pain for up to 7 days. 21 Tablet 0    oxyCODONE immediate-release (ROXICODONE) 5 MG Tab Take 0.5-1 Tablets by mouth every 6 hours as needed for Severe Pain for up to 4 days. 16 Tablet 0       Clemente Dumas M.D.

## 2024-12-25 NOTE — H&P
CHIEF COMPLAINT:  1.  A 39 and 4/7 weeks gestation.  2.  Previous  section for dystocia.  3.  Rh negative, non-sensitized.     HISTORY OF PRESENT ILLNESS:  The patient is a 29-year-old lady,  2,   para 1.  Her MARCO ANTONIO is 2024, so her EGA is 39 and 4/7 weeks at the time of   the scheduled repeat  section.  She declines sterilization.  She has a   previous  section for dystocia.  Risks and benefits were discussed.     The patient is a devout Zoroastrian and would decline any at all   cellular blood products as well as fresh frozen plasma.  Her advanced   directive is signed and in the chart.     PRENATAL CARE:  Blood type is O negative.  She did receive antepartum RhoGAM.    She declined all optional genetic screening tests as well as maternal serum   alpha-fetoprotein testing.  Ultrasound at 20 weeks revealed anatomically   normal female fetus with right/anterior fundal placenta, remote from the    scar, and a normal clear zone.  Gestational diabetes screen was   normal.  All of her blood pressures were normal.  Her total pregnancy weight   gain was 15 pounds.  She did receive Tdap.     OBSTETRIC HISTORY:  1.  Primary low transverse  section at 41 and 2/7 weeks 2020, 8   pound baby girl.  She had arrest of dilatation at 4 cm and suspected   chorioamnionitis.  2.  Present pregnancy.     PAST MEDICAL HISTORY:  Positive for asthma.     ALLERGIES:  No known drug allergies.     MEDICATIONS:   1.  Prenatal vitamin daily.  2.  QVAR RediHaler 1 actuation daily.     PAST SURGICAL HISTORY:  Primary  section in 2020.     FAMILY HISTORY:  Positive for diabetes in every grandparent.  Chronic   hypertension in her maternal grandmother.  Colon cancer in her maternal   grandfather.     SOCIAL HISTORY:  She is employed with [employer] in the [employer]   department.  She is  to [place], a window covering .  She   denies alcohol, tobacco or  drug use.     PHYSICAL EXAMINATION:  VITAL SIGNS:  Afebrile, /60, weight 190 pounds.  HEENT:  Normal.  LUNGS:  Clear to auscultation.  HEART:  Sounds normal.  ABDOMEN:  Nontender.  Fundal height is appropriate.  No hepatosplenomegaly. Prominent Pfannenstiel keloid scar.  EXTREMITIES:  No edema.  Homans' negative.  NEUROLOGIC:  DTRs normal.  PELVIC:  Not indicated.     DIAGNOSES:  1.  A 39 and 4/7 week gestation.  2.  Previous  section for dystocia.  3.  Rh negative, non-sensitized.  4.  The patient is a devout Pentecostal and would refuse transfusion of   all blood products as well as fresh frozen plasma.     PLAN:    Repeat low transverse  section.    She does have a prominent keloid scar from her first  where her skin was closed with 4-0 Monocryl subcuticular suture, so at this time, I will try Insorb resorbable subcutaneous staples for the skin closure.           ______________________________  Clemente Dumas MD

## 2025-07-02 ENCOUNTER — APPOINTMENT (OUTPATIENT)
Dept: MEDICAL GROUP | Facility: PHYSICIAN GROUP | Age: 30
End: 2025-07-02
Payer: COMMERCIAL

## 2025-07-21 ENCOUNTER — OFFICE VISIT (OUTPATIENT)
Dept: MEDICAL GROUP | Facility: MEDICAL CENTER | Age: 30
End: 2025-07-21
Payer: COMMERCIAL

## 2025-07-21 VITALS
HEIGHT: 60 IN | DIASTOLIC BLOOD PRESSURE: 68 MMHG | HEART RATE: 73 BPM | WEIGHT: 168.98 LBS | TEMPERATURE: 97.8 F | SYSTOLIC BLOOD PRESSURE: 112 MMHG | BODY MASS INDEX: 33.18 KG/M2 | OXYGEN SATURATION: 97 %

## 2025-07-21 DIAGNOSIS — E66.811 OBESITY (BMI 30.0-34.9): ICD-10-CM

## 2025-07-21 DIAGNOSIS — Z71.3 ENCOUNTER FOR WEIGHT LOSS COUNSELING: Primary | ICD-10-CM

## 2025-07-21 PROCEDURE — 3078F DIAST BP <80 MM HG: CPT | Performed by: FAMILY MEDICINE

## 2025-07-21 PROCEDURE — 99214 OFFICE O/P EST MOD 30 MIN: CPT | Performed by: FAMILY MEDICINE

## 2025-07-21 PROCEDURE — 3074F SYST BP LT 130 MM HG: CPT | Performed by: FAMILY MEDICINE

## 2025-07-21 ASSESSMENT — PATIENT HEALTH QUESTIONNAIRE - PHQ9: CLINICAL INTERPRETATION OF PHQ2 SCORE: 0

## 2025-07-21 NOTE — PROGRESS NOTES
This medical record contains text that has been entered with the assistance of computer voice recognition and dictation software.  Therefore, it may contain unintended errors in text, spelling, punctuation, or grammar        Chief Complaint   Patient presents with    Weight Loss     Discuss options and protocol       Yuri Sanchez is a 30 y.o. female here evaluation and management of:       History of Present Illness  The patient presents for weight loss management.    She seeks advice on weight loss medication, aiming to avoid unsuitable options. She understands medication can jump-start weight loss and seeks assistance. She acknowledges being an emotional eater and is curious about potential weight regain post-medication. She inquires about medication safety during breastfeeding, as she is currently nursing. She mentions mild anxiety and concerns about medications exacerbating it.    She reports anemia in 2024 after giving birth to her second child. She has two children, born in  and .    Coffee: The patient drinks coffee.        Current Medications[1]  Patient Active Problem List    Diagnosis Date Noted    Uterine scar from previous  delivery 2024    Indication for care in labor or delivery 2024    Encounter for initial prescription of vaginal ring hormonal contraceptive 2023    Allergy to cats 2022    Labor and delivery indication for care or intervention 2020    Arrest of dilation, delivered, current hospitalization 2020    Postoperative pain 2020    Less than 8 weeks gestation of pregnancy 2019    Flexural eczema 2019    Mild intermittent asthma without complication 2018    BMI 36.0-36.9,adult 2018     Past Surgical History[2]   Social History[3]  Family History   Problem Relation Age of Onset    Alzheimer's Disease Mother     Alzheimer's Disease Sister            ROS    all review of system completed and negative  except for those listed above     Objective:     /68 (BP Location: Right arm, Patient Position: Sitting, BP Cuff Size: Adult)   Pulse 73   Temp 36.6 °C (97.8 °F) (Temporal)   Ht 1.524 m (5')   Wt 76.6 kg (168 lb 15.7 oz)   SpO2 97%  Body mass index is 33 kg/m².  Physical Exam:        GEN: comfortable, alert and oriented, well nourished, well developed, in no apparent distress   HEENT: NCAT, eyes: pupils equal and reactive, sclera white, EOMIT, good dentition  HEART: limbs warm and well perfused, regular rate, no JVD, no lower extremity edema  LUNGS: speaking in full sentences, not in apparent respiratory distress, no audible wheezes  MSK: normal tone and bulk, no swelling of the joints, gait steady and normal       Assessment and Plan:   The following treatment plan was discussed        Assessment & Plan  Encounter for weight loss counseling    Orders:    TSH WITH REFLEX TO FT4; Future    Comp Metabolic Panel; Future    Lipid Profile; Future    CBC WITH DIFFERENTIAL; Future    Obesity (BMI 30.0-34.9)    Orders:    TSH WITH REFLEX TO FT4; Future    Comp Metabolic Panel; Future    Lipid Profile; Future    CBC WITH DIFFERENTIAL; Future        Assessment & Plan  Weight management  BP readings normal.  Discussed weight loss medications: GLP-1 agonists (tirzepatide, Wegovy), Qsymia, Contrave, phentermine.  Reviewed benefits and side effects; patient interested in GLP-1 agonists or Qsymia.  Recommended 6-month weight loss period followed by 6-month to 2-year maintenance phase; discussed expected weight regain and medication safety during breastfeeding, avoiding sedatives.    Treatment plan: Discussed weight loss medications: GLP-1 agonists (tirzepatide, Wegovy), Qsymia, Contrave, phentermine. Reviewed benefits and side effects; patient interested in GLP-1 agonists or Qsymia.    Lifestyle modification: Recommended 6-month weight loss period followed by 6-month to 2-year maintenance phase; discussed expected  weight regain and medication safety during breastfeeding, avoiding sedatives.    Clinical decision making: Reviewed benefits and side effects; patient interested in GLP-1 agonists or Qsymia.    Follow-up: Scheduled in 1 month.          Instructed to follow up if symptoms worsen or fail to improve, ER/UC precautions discussed as well    Pauline Mesa MD  [address]   [place] [address]  Phone: [phone]                    [1]   Current Outpatient Medications   Medication Sig Dispense Refill    Prenatal Multivit-Min-Fe-FA (PRE-DAVID PO) Take  by mouth every day.      beclomethasone HFA (QVAR REDIHALER) 80 MCG/ACT inhaler Inhale 1 Puff 2 times a day. 10.6 g 5    albuterol 108 (90 Base) MCG/ACT Aero Soln inhalation aerosol Shake well.  Take 2 puffs every 4 hours as needed for cough, chest tightness, or wheezing. 8.5 g 11    acetaminophen (TYLENOL) 325 MG Tab Take 1-2 Tabs by mouth every 6 hours as needed for Mild Pain or Moderate Pain.      Spacer/Aero-Holding Chambers (AEROCHAMBER PLUS CINDY-VU) Misc by Other route.       No current facility-administered medications for this visit.   [2]   Past Surgical History:  Procedure Laterality Date    REPEAT C SECTION N/A 2024    Procedure: REPEAT  SECTION;  Surgeon: Clemente Dumas M.D.;  Location: [facility];  Service: Labor and Delivery    AL  DELIVERY ONLY N/A 04/10/2020    Procedure:  SECTION, PRIMARY;  Surgeon: Sulma Hernandez M.D.;  Location: LABOR AND DELIVERY;  Service: Labor and Delivery    GYN SURGERY     [3]   Social History  Tobacco Use    Smoking status: Never    Smokeless tobacco: Never   Vaping Use    Vaping status: Never Used   Substance Use Topics    Alcohol use: Not Currently     Comment: weekends    Drug use: Never

## (undated) DEVICE — SLEEVE, SEQUENTIAL CALF REG

## (undated) DEVICE — PAD LAP STERILE 18 X 18 - (5/PK 40PK/CA)

## (undated) DEVICE — SUTURE 0 36IN PDS + VIO CT-1 (36PK/BX)

## (undated) DEVICE — CLOSURE SKIN STRIP 1/2 X 4 IN - (STERI STRIP) (50/BX 4BX/CA)

## (undated) DEVICE — SUTURE 0 VICRYL PLUS CT 36 (36PK/BX)"

## (undated) DEVICE — CANISTER SUCTION 3000ML MECHANICAL FILTER AUTO SHUTOFF MEDI-VAC NONSTERILE LF DISP  (40EA/CA)

## (undated) DEVICE — TRAY SPINAL ANESTHESIA NON-SAFETY (10/CA)

## (undated) DEVICE — TAPE CLOTH MEDIPORE 6 INCH - (12RL/CA)

## (undated) DEVICE — HEAD HOLDER JUNIOR/ADULT

## (undated) DEVICE — GLOVE BIOGEL SZ 6.5 SURGICAL PF LTX (50PR/BX 4BX/CA)

## (undated) DEVICE — TUBING CLEARLINK DUO-VENT - C-FLO (48EA/CA)

## (undated) DEVICE — CATHETER IV NON-SAFETY 18 GA X 1 1/4 (50/BX 4BX/CA)

## (undated) DEVICE — SET EXTENSION WITH 2 PORTS (48EA/CA) ***PART #2C8610 IS A SUBSTITUTE*****

## (undated) DEVICE — PACK C-SECTION (2EA/CA)

## (undated) DEVICE — SUTURE3-0 36IN VCRLY PLS ANTI (36PK/BX)

## (undated) DEVICE — DRESSING POST OP BORDER 4 X 10 (5EA/BX)

## (undated) DEVICE — BLANKET UNDERBODY FULL ACCES - (5/CA)

## (undated) DEVICE — WATER IRRIGATION STERILE 1000ML (12EA/CA)

## (undated) DEVICE — KIT  I.V. START (100EA/CA)

## (undated) DEVICE — GLOVE BIOGEL SZ 7 SURGICAL PF LTX - (50PR/BX 4BX/CA)

## (undated) DEVICE — SUTURE 2-0 CHROMIC GUT CT-1 27 (36PK/BX)"

## (undated) DEVICE — SODIUM CHL IRRIGATION 0.9% 1000ML (12EA/CA)

## (undated) DEVICE — CHLORAPREP 26 ML APPLICATOR - ORANGE TINT(25/CA)

## (undated) DEVICE — WATER IRRIG. STER. 1500 ML - (9/CA)

## (undated) DEVICE — SUTURE 2-0 VICRYL PLUS CT-1 36 (36PK/BX)"

## (undated) DEVICE — ELECTRODE DUAL RETURN W/ CORD - (50/PK)

## (undated) DEVICE — RETRACTOR O C SECTION LRY - (5/BX)

## (undated) DEVICE — SUTURE 3-0 VICRYL PLUS CT-1 - 36 INCH (36/BX)

## (undated) DEVICE — GLOVE BIOGEL SZ 8.5 SURGICAL PF LTX - (50PR/BX 4BX/CA)

## (undated) DEVICE — SUTURE 4-0 VICRYL PLUS FS-1 - 27 INCH (36/BX)

## (undated) DEVICE — DETERGENT RENUZYME PLUS 10 OZ PACKET (50/BX)

## (undated) DEVICE — STAPLER SKIN DISP - (6/BX 10BX/CA) VISISTAT

## (undated) DEVICE — SUTURE 0 VICRYL PLUS CT-1 - 36 INCH (36/BX)